# Patient Record
Sex: MALE | Race: WHITE | Employment: FULL TIME | URBAN - METROPOLITAN AREA
[De-identification: names, ages, dates, MRNs, and addresses within clinical notes are randomized per-mention and may not be internally consistent; named-entity substitution may affect disease eponyms.]

---

## 2017-02-03 ENCOUNTER — OFFICE VISIT (OUTPATIENT)
Dept: PRIMARY CARE CLINIC | Age: 32
End: 2017-02-03

## 2017-02-03 VITALS
BODY MASS INDEX: 32.02 KG/M2 | DIASTOLIC BLOOD PRESSURE: 87 MMHG | HEART RATE: 90 BPM | HEIGHT: 72 IN | SYSTOLIC BLOOD PRESSURE: 127 MMHG | TEMPERATURE: 98 F | OXYGEN SATURATION: 98 % | RESPIRATION RATE: 16 BRPM | WEIGHT: 236.4 LBS

## 2017-02-03 DIAGNOSIS — J01.00 ACUTE MAXILLARY SINUSITIS, RECURRENCE NOT SPECIFIED: Primary | ICD-10-CM

## 2017-02-03 RX ORDER — AMOXICILLIN AND CLAVULANATE POTASSIUM 875; 125 MG/1; MG/1
1 TABLET, FILM COATED ORAL EVERY 12 HOURS
Qty: 20 TAB | Refills: 0 | Status: SHIPPED | OUTPATIENT
Start: 2017-02-03 | End: 2017-02-13

## 2017-02-03 NOTE — MR AVS SNAPSHOT
Visit Information Date & Time Provider Department Dept. Phone Encounter #  
 2/3/2017  5:45 PM Brenda Pateldy, [address] [facility] [phone] [id number] Follow-up Instructions Return if symptoms worsen or fail to improve. Upcoming Health Maintenance Date Due DTaP/Tdap/Td series (1 - Tdap) 2/17/2008 INFLUENZA AGE 9 TO ADULT 8/1/2016 Allergies as of 2/3/2017  Review Complete On: 2/3/2017 By: Brenda Campoverde NP Severity Noted Reaction Type Reactions Codeine  02/03/2017    Other (comments) bradycardia Current Immunizations  Never Reviewed No immunizations on file. Not reviewed this visit You Were Diagnosed With   
  
 Codes Comments Acute maxillary sinusitis, recurrence not specified    -  Primary ICD-10-CM: J01.00 ICD-9-CM: 461.0 Vitals BP Pulse Temp Resp Height(growth percentile) Weight(growth percentile) 127/87 (BP 1 Location: Right arm, BP Patient Position: Sitting) 90 98 °F (36.7 °C) (Oral) 16 6' (1.829 m) 236 lb 6.4 oz (107.2 kg) SpO2 BMI Smoking Status 98% 32.06 kg/m2 Never Smoker Vitals History BMI and BSA Data Body Mass Index Body Surface Area 32.06 kg/m 2 2.33 m 2 Preferred Pharmacy Pharmacy Name Phone "[facility][id number] [facility] [phone] Your Updated Medication List  
  
   
This list is accurate as of: 2/3/17  6:42 PM.  Always use your most recent med list.  
  
  
  
  
 amoxicillin-clavulanate 875-125 mg per tablet Commonly known as:  AUGMENTIN Take 1 Tab by mouth every twelve (12) hours for 10 days. Prescriptions Sent to Pharmacy Refills  
 amoxicillin-clavulanate (AUGMENTIN) 875-125 mg per tablet 0 Sig: Take 1 Tab by mouth every twelve (12) hours for 10 days.   
 Class: Normal  
 [id number] [id number], [address] [place] [address] Luis SALINAS AT [facility] #: [phone] Route: Oral  
  
We Performed the Following REFERRAL TO PRIMARY CARE [PGJ470 CPT(R)] Comments:  
 Please evaluate patient for physical and establish PCP Follow-up Instructions Return if symptoms worsen or fail to improve. Referral Information Referral ID Referred By Referred To  
  
 [id number] Lora RENO [facility]   
   [address] [place] [address] [facility], [address] Phone: [phone] Fax: [phone] Visits Status Start Date End Date 1 New Request 2/3/17 2/3/18 If your referral has a status of pending review or denied, additional information will be sent to support the outcome of this decision. Patient Instructions Saline Nasal Washes: Care Instructions Your Care Instructions Saline nasal washes help keep the nasal passages open by washing out thick or dried mucus. This simple remedy can help relieve symptoms of allergies, sinusitis, and colds. It also can make the nose feel more comfortable by keeping the mucous membranes moist. You may notice a little burning sensation in your nose the first few times you use the solution, but this usually gets better in a few days. Follow-up care is a key part of your treatment and safety. Be sure to make and go to all appointments, and call your doctor if you are having problems. It's also a good idea to know your test results and keep a list of the medicines you take. How can you care for yourself at home? · You can buy premixed saline solution in a squeeze bottle or other sinus rinse products at a drugstore. Read and follow the instructions on the label. · You also can make your own saline solution by adding 1 teaspoon of salt and 1 teaspoon of baking soda to 2 cups of distilled water. · If you use a homemade solution, pour a small amount into a clean bowl. Using a rubber bulb syringe, squeeze the syringe and place the tip in the salt water. Pull a small amount of the salt water into the syringe by relaxing your hand. · Sit down with your head tilted slightly back. Do not lie down. Put the tip of the bulb syringe or the squeeze bottle a little way into one of your nostrils. Gently drip or squirt a few drops into the nostril. Repeat with the other nostril. Some sneezing and gagging are normal at first. 
· Gently blow your nose. · Wipe the syringe or bottle tip clean after each use. · Repeat this 2 or 3 times a day. · Use nasal washes gently if you have nosebleeds often. When should you call for help? Watch closely for changes in your health, and be sure to contact your doctor if: 
· You often get nosebleeds. · You have problems doing the nasal washes. Where can you learn more? Go to http://jazz-brandon.info/. Enter [id number] [phone] in the search box to learn more about \"Saline Nasal Washes: Care Instructions. \" Current as of: July 29, 2016 Content Version: 11.1 © 8041-9302 [employer]. Care instructions adapted under license by [employer] (which disclaims liability or warranty for this information). If you have questions about a medical condition or this instruction, always ask your healthcare professional. [address] any warranty or liability for your use of this information. Introducing \[facility]\"" & HEALTH SERVICES! Kiaan Padilla introduces Sasets.com patient portal. Now you can access parts of your medical record, email your doctor's office, and request medication refills online. 1. In your internet browser, go to [email or web address] 2. Click on the First Time User? Click Here link in the Sign In box. You will see the New Member Sign Up page. 3. Enter your [email or web address].com Access Code exactly as it appears below. You will not need to use this code after youve completed the sign-up process.  If you do not sign up before the expiration date, you must request a new code. · O-CODES Access Code: Jihan Aldana Expires: 5/4/2017  6:39 PM 
 
4. Enter the last four digits of your Social Security Number (xxxx) and Date of Birth (mm/dd/yyyy) as indicated and click Submit. You will be taken to the next sign-up page. 5. Create a O-CODES ID. This will be your O-CODES login ID and cannot be changed, so think of one that is secure and easy to remember. 6. Create a O-CODES password. You can change your password at any time. 7. Enter your Password Reset Question and Answer. This can be used at a later time if you forget your password. 8. Enter your e-mail address. You will receive e-mail notification when new information is available in [address] 9. Click Sign Up. You can now view and download portions of your medical record. 10. Click the Download Summary menu link to download a portable copy of your medical information. If you have questions, please visit the Frequently Asked Questions section of the O-CODES website. Remember, O-CODES is NOT to be used for urgent needs. For medical emergencies, dial 911. Now available from your iPhone and Android! Please provide this summary of care documentation to your next provider. If you have any questions after today's visit, please call [phone].

## 2017-02-03 NOTE — PATIENT INSTRUCTIONS
Saline Nasal Washes: Care Instructions  Your Care Instructions  Saline nasal washes help keep the nasal passages open by washing out thick or dried mucus. This simple remedy can help relieve symptoms of allergies, sinusitis, and colds. It also can make the nose feel more comfortable by keeping the mucous membranes moist. You may notice a little burning sensation in your nose the first few times you use the solution, but this usually gets better in a few days. Follow-up care is a key part of your treatment and safety. Be sure to make and go to all appointments, and call your doctor if you are having problems. It's also a good idea to know your test results and keep a list of the medicines you take. How can you care for yourself at home? · You can buy premixed saline solution in a squeeze bottle or other sinus rinse products at a drugstore. Read and follow the instructions on the label. · You also can make your own saline solution by adding 1 teaspoon of salt and 1 teaspoon of baking soda to 2 cups of distilled water. · If you use a homemade solution, pour a small amount into a clean bowl. Using a rubber bulb syringe, squeeze the syringe and place the tip in the salt water. Pull a small amount of the salt water into the syringe by relaxing your hand. · Sit down with your head tilted slightly back. Do not lie down. Put the tip of the bulb syringe or the squeeze bottle a little way into one of your nostrils. Gently drip or squirt a few drops into the nostril. Repeat with the other nostril. Some sneezing and gagging are normal at first.  · Gently blow your nose. · Wipe the syringe or bottle tip clean after each use. · Repeat this 2 or 3 times a day. · Use nasal washes gently if you have nosebleeds often. When should you call for help? Watch closely for changes in your health, and be sure to contact your doctor if:  · You often get nosebleeds. · You have problems doing the nasal washes.   Where can you learn more? Go to http://jazz-brandon.info/. Enter [id number] [id number] [phone] in the search box to learn more about \"Saline Nasal Washes: Care Instructions. \"  Current as of: July 29, 2016  Content Version: 11.1  © 7216-6902 "[employer]. Care instructions adapted under license by [employer] (which disclaims liability or warranty for this information). If you have questions about a medical condition or this instruction, always ask your healthcare professional. [address] any warranty or liability for your use of this information.

## 2017-02-03 NOTE — PROGRESS NOTES
Subjective:   Darryle Colt is a 36 y.o. male who complains of congestion, sneezing, swollen glands, nasal blockage, post nasal drip, dry cough and bilateral sinus pain for 7 days, gradually worsening since that time. He denies a history of shortness of breath and wheezing. Evaluation to date: none. Treatment to date: OTC products. Patient does not smoke cigarettes. Relevant PMH: No pertinent additional PMH. There is no problem list on file for this patient. There are no active problems to display for this patient. Allergies   Allergen Reactions    Codeine Other (comments)     bradycardia     History reviewed. No pertinent past medical history. History reviewed. No pertinent past surgical history. Review of Systems  Pertinent items are noted in HPI. Objective:     Visit Vitals    /87 (BP 1 Location: Right arm, BP Patient Position: Sitting)    Pulse 90    Temp 98 °F (36.7 °C) (Oral)    Resp 16    Ht 6' (1.829 m)    Wt 236 lb 6.4 oz (107.2 kg)    SpO2 98%    BMI 32.06 kg/m2     General:  alert, cooperative, no distress   Eyes: negative   Ears: abnormal TM AD - erythematous, bulging, abnormal TM AS - erythematous, bulging   Sinuses: tenderness over bilateral maxillary, frontal and ethmoid   Mouth:  Lips, mucosa, and tongue normal. Teeth and gums normal and abnormal findings: moderate oropharyngeal erythema   Neck: supple, symmetrical, trachea midline and mild anterior cervical adenopathy. Heart: S1 and S2 normal, no murmurs noted. Lungs: clear to auscultation bilaterally   Abdomen: soft, non-tender. Bowel sounds normal. No masses,  no organomegaly        Assessment/Plan:   sinusitis  Discussed the dx and tx of sinusitis. Suggested symptomatic OTC remedies. Nasal saline sprays for congestion. Antibiotics per orders. RTC prn.

## 2017-02-03 NOTE — PROGRESS NOTES
Chief Complaint   Patient presents with    Nasal Congestion     c/o nasal congestion and produtive cough with green mucus x 1 week, has tried otc decongestants to help    Cough

## 2017-05-22 ENCOUNTER — OFFICE VISIT (OUTPATIENT)
Dept: PRIMARY CARE CLINIC | Age: 32
End: 2017-05-22

## 2017-05-22 VITALS
TEMPERATURE: 98.4 F | RESPIRATION RATE: 16 BRPM | WEIGHT: 230 LBS | HEART RATE: 66 BPM | HEIGHT: 72 IN | DIASTOLIC BLOOD PRESSURE: 62 MMHG | SYSTOLIC BLOOD PRESSURE: 110 MMHG | BODY MASS INDEX: 31.15 KG/M2 | OXYGEN SATURATION: 98 %

## 2017-05-22 DIAGNOSIS — M25.571 ACUTE RIGHT ANKLE PAIN: Primary | ICD-10-CM

## 2017-05-22 NOTE — PROGRESS NOTES
Chief Complaint   Patient presents with    Ankle Injury     c/o R ankle injury, states he twisted R ankle on Friday while at the gym and again on Saturday when he was at a competition,has been using ibuprofen and ice

## 2017-05-22 NOTE — PROGRESS NOTES
Subjective:      Marleny Moeller is a 29 y.o. male seen for evaluation and treatment of a right ankle injury. This is evaluated as a personal injury. The injury was sustained a few days ago, and occurred while [employer]. He states his ankle popped 6-7 times during [employer] several days ago then 2 days ago was doing [employer] again, heard a loud pop. Now having ankle pain and swelling. C/o aching sensation at rest, walking and climbing stairs cause worsening pain. Denies any previous trauma or injury to the right foot/ankle. He is using Lidocaine topical and Ibuprofen for pain. History reviewed. No pertinent past medical history. History reviewed. No pertinent surgical history. Allergies   Allergen Reactions    Codeine Hives    Codeine Other (comments)     bradycardia    Shellfish Derived Nausea and Vomiting       PCP - no PCP      Objective:     Visit Vitals    /62 (BP 1 Location: Left arm, BP Patient Position: Sitting)    Pulse 66    Temp 98.4 °F (36.9 °C) (Oral)    Resp 16    Ht 6' (1.829 m)    Wt 230 lb (104.3 kg)    SpO2 98%    BMI 31.19 kg/m2      Appearance: alert, well appearing, and in no distress. Musculoskeletal exam: abnormal exam of right ankle: swelling to lateral malleolus and TTP. No ecchymosis. Able to bear weight. Imaging  pending official reading by Radiologist    Assessment/Plan:       ICD-10-CM ICD-9-CM    1. Acute right ankle pain M25.571 719.47 XR ANKLE RT MIN 3 V     338.19      Will report xray results when available. Continue Ibuprofen prn pain. The patient is advised to rest, apply cold or ice intermittently, elevate affected extremity and use ACE bandage. Recommend no martial arts for at least a week. See Ortho if persistent symptoms. RTC prn. Anderson Engle NP  This note will not be viewable in [address]

## 2017-05-22 NOTE — MR AVS SNAPSHOT
Visit Information Date & Time Provider Department Dept. Phone Encounter #  
 5/22/2017  3:00 PM Kyle Schmidt NP [address] [facility] [address] [phone] [id number] Follow-up Instructions Return if symptoms worsen or fail to improve. Upcoming Health Maintenance Date Due DTaP/Tdap/Td series (1 - Tdap) 2/17/2008 INFLUENZA AGE 9 TO ADULT 8/1/2017 Allergies as of 5/22/2017  Review Complete On: 5/22/2017 By: Kyle Schmidt NP Severity Noted Reaction Type Reactions Codeine  10/28/2015    Hives Codeine  02/03/2017    Other (comments) bradycardia Shellfish Derived  10/28/2015    Nausea and Vomiting Current Immunizations  Never Reviewed No immunizations on file. Not reviewed this visit You Were Diagnosed With   
  
 Codes Comments Acute right ankle pain    -  Primary ICD-10-CM: M25.571 ICD-9-CM: 719.47, 338.19 Vitals BP Pulse Temp Resp Height(growth percentile) Weight(growth percentile) 110/62 (BP 1 Location: Left arm, BP Patient Position: Sitting) 66 98.4 °F (36.9 °C) (Oral) 16 6' (1.829 m) 230 lb (104.3 kg) SpO2 BMI Smoking Status 98% 31.19 kg/m2 Never Smoker Vitals History BMI and BSA Data Body Mass Index Body Surface Area  
 31.19 kg/m 2 2.3 m 2 Preferred Pharmacy [facility] Name Phone [id number] [facility] [phone] Your Updated Medication List  
  
Notice  As of 5/22/2017  3:52 PM  
 You have not been prescribed any medications. Follow-up Instructions Return if symptoms worsen or fail to improve. To-Do List   
 05/22/2017 Imaging:  XR ANKLE RT MIN 3 V Patient Instructions Learning About RICE (Rest, Ice, Compression, and Elevation) What is RICE? RICE is a way to care for an injury. RICE helps relieve pain and swelling. It may also help with healing and flexibility. RICE stands for: · Rest and protect the injured or sore area. · Ice or a cold pack used as soon as possible. · Compression, or wrapping the injured or sore area with an elastic bandage. · Elevation (propping up) the injured or sore area. How do you do RICE? You can use RICE for home treatment when you have general aches and pains or after an injury or surgery. Rest 
· Do not put weight on the injury for at least 24 to 48 hours. · Use crutches for a badly sprained knee or ankle. · Support a sprained wrist, elbow, or shoulder with a sling. Ice · Put ice or a cold pack on the injury right away to reduce pain and swelling. Frozen vegetables will also work as an ice pack. Put a thin cloth between the ice or cold pack and your skin. The cloth protects the injured area from getting too cold. · Use ice for 10 to 15 minutes at a time for the first 48 to 72 hours. Compression · Use compression for sprains, strains, and surgeries of the arms and legs. · Wrap the injured area with an elastic bandage or compression sleeve to reduce swelling. · Don't wrap it too tightly. If the area below it feels numb, tingles, or feels cool, loosen the wrap. Elevation · Use elevation for areas of the body that can be propped up, such as arms and legs. · Prop up the injured area on pillows whenever you use ice. Keep it propped up anytime you sit or lie down. · Try to keep the injured area at or above the level of your heart. This will help reduce swelling and bruising. Where can you learn more? Go to http://jazz-brandon.info/. Enter Q077 in the search box to learn more about \"Learning About RICE (Rest, Ice, Compression, and Elevation). \" 
Current as of: May 23, 2016 Content Version: 11.2 © 6307-1877 [employer].  Care instructions adapted under license by [employer] (which disclaims liability or warranty for this information). If you have questions about a medical condition or this instruction, always ask your healthcare professional. [address] any warranty or liability for your use of this information. Introducing [facility] & [facility] SERVICES! Kiana Wiggins introduces Posiba patient portal. Now you can access parts of your medical record, email your doctor's office, and request medication refills online. 1. In your internet browser, go to [email or web address] 2. Click on the First Time User? Click Here link in the Sign In box. You will see the New Member Sign Up page. 3. Enter your Posiba Access Code exactly as it appears below. You will not need to use this code after youve completed the sign-up process. If you do not sign up before the expiration date, you must request a new code. · Posiba Access Code: [email or web address] Expires: 8/20/2017  3:21 PM 
 
4. Enter the last four digits of your Social Security Number (xxxx) and Date of Birth (mm/dd/yyyy) as indicated and click Submit. You will be taken to the next sign-up page. 5. Create a Posiba ID. This will be your Posiba login ID and cannot be changed, so think of one that is secure and easy to remember. 6. Create a Posiba password. You can change your password at any time. 7. Enter your Password Reset Question and Answer. This can be used at a later time if you forget your password. 8. Enter your e-mail address. You will receive e-mail notification when new information is available in [address] 9. Click Sign Up. You can now view and download portions of your medical record. 10. Click the Download Summary menu link to download a portable copy of your medical information. If you have questions, please visit the Frequently Asked Questions section of the Posiba website. Remember, Posiba is NOT to be used for urgent needs. For medical emergencies, dial 911. Now available from your iPhone and Android! Please provide this summary of care documentation to your next provider. Your primary care clinician is listed as NONE. If you have any questions after today's visit, please call [phone].

## 2017-05-22 NOTE — PATIENT INSTRUCTIONS
Learning About RICE (Rest, Ice, Compression, and Elevation)  What is RICE? RICE is a way to care for an injury. RICE helps relieve pain and swelling. It may also help with healing and flexibility. RICE stands for:  · Rest and protect the injured or sore area. · Ice or a cold pack used as soon as possible. · Compression, or wrapping the injured or sore area with an elastic bandage. · Elevation (propping up) the injured or sore area. How do you do RICE? You can use RICE for home treatment when you have general aches and pains or after an injury or surgery. Rest  · Do not put weight on the injury for at least 24 to 48 hours. · Use crutches for a badly sprained knee or ankle. · Support a sprained wrist, elbow, or shoulder with a sling. Ice  · Put ice or a cold pack on the injury right away to reduce pain and swelling. Frozen vegetables will also work as an ice pack. Put a thin cloth between the ice or cold pack and your skin. The cloth protects the injured area from getting too cold. · Use ice for 10 to 15 minutes at a time for the first 48 to 72 hours. Compression  · Use compression for sprains, strains, and surgeries of the arms and legs. · Wrap the injured area with an elastic bandage or compression sleeve to reduce swelling. · Don't wrap it too tightly. If the area below it feels numb, tingles, or feels cool, loosen the wrap. Elevation  · Use elevation for areas of the body that can be propped up, such as arms and legs. · Prop up the injured area on pillows whenever you use ice. Keep it propped up anytime you sit or lie down. · Try to keep the injured area at or above the level of your heart. This will help reduce swelling and bruising. Where can you learn more? Go to http://jazz-brandon.info/. Enter Z129 in the search box to learn more about \"Learning About RICE (Rest, Ice, Compression, and Elevation). \"  Current as of: May 23, 2016  Content Version: 11.2  © 6427-3530 [employer]. Care instructions adapted under license by [employer] (which disclaims liability or warranty for this information). If you have questions about a medical condition or this instruction, always ask your healthcare professional. [address] any warranty or liability for your use of this information.

## 2017-12-29 ENCOUNTER — OFFICE VISIT (OUTPATIENT)
Dept: PRIMARY CARE CLINIC | Age: 32
End: 2017-12-29

## 2017-12-29 VITALS
TEMPERATURE: 98.2 F | BODY MASS INDEX: 32.91 KG/M2 | RESPIRATION RATE: 18 BRPM | HEART RATE: 101 BPM | DIASTOLIC BLOOD PRESSURE: 85 MMHG | OXYGEN SATURATION: 96 % | SYSTOLIC BLOOD PRESSURE: 120 MMHG | WEIGHT: 243 LBS | HEIGHT: 72 IN

## 2017-12-29 DIAGNOSIS — J01.00 ACUTE MAXILLARY SINUSITIS, RECURRENCE NOT SPECIFIED: Primary | ICD-10-CM

## 2017-12-29 RX ORDER — AMOXICILLIN AND CLAVULANATE POTASSIUM 875; 125 MG/1; MG/1
1 TABLET, FILM COATED ORAL EVERY 12 HOURS
Qty: 14 TAB | Refills: 0 | Status: SHIPPED | OUTPATIENT
Start: 2017-12-29 | End: 2018-01-05

## 2017-12-29 NOTE — MR AVS SNAPSHOT
Visit Information Date & Time Provider Department Dept. Phone Encounter #  
 12/29/2017 12:15 PM Ciarra Meadows  [facility] [phone] Follow-up Instructions Return if symptoms worsen or fail to improve. Upcoming Health Maintenance Date Due DTaP/Tdap/Td series (1 - Tdap) 2/17/2008 Allergies as of 12/29/2017  Review Complete On: 12/29/2017 By: Ciarra Meadows NP Severity Noted Reaction Type Reactions Codeine  10/28/2015    Hives Codeine  02/03/2017    Other (comments) bradycardia Shellfish Derived  10/28/2015    Nausea and Vomiting Current Immunizations  Never Reviewed No immunizations on file. Not reviewed this visit You Were Diagnosed With   
  
 Codes Comments Acute maxillary sinusitis, recurrence not specified    -  Primary ICD-10-CM: J01.00 ICD-9-CM: 461.0 Vitals BP Pulse Temp Resp Height(growth percentile) Weight(growth percentile) 120/85 (BP 1 Location: Right arm, BP Patient Position: Sitting) (!) 101 98.2 °F (36.8 °C) (Oral) 18 6' (1.829 m) 243 lb (110.2 kg) SpO2 BMI Smoking Status 96% 32.96 kg/m2 Never Smoker Vitals History BMI and BSA Data Body Mass Index Body Surface Area  
 32.96 kg/m 2 2.37 m 2 Preferred Pharmacy Pharmacy Name Phone [phone] [facility] [phone] Your Updated Medication List  
  
   
This list is accurate as of: 12/29/17 12:25 PM.  Always use your most recent med list.  
  
  
  
  
 amoxicillin-clavulanate 875-125 mg per tablet Commonly known as:  AUGMENTIN Take 1 Tab by mouth every twelve (12) hours for 7 days. Prescriptions Sent to Pharmacy Refills  
 amoxicillin-clavulanate (AUGMENTIN) 875-125 mg per tablet 0 Sig: Take 1 Tab by mouth every twelve (12) hours for 7 days.   
 Class: Normal  
 Pharmacy: [facility]/pharmacy #[id number] - [address], [address] [place] [address] #: [phone] Route: Oral  
  
We Performed the Following REFERRAL TO PRIMARY CARE [HOJ303 CPT(R)] Comments:  
 Please evaluate patient to establish primary care. Follow-up Instructions Return if symptoms worsen or fail to improve. Referral Information Referral ID Referred By Referred To  
  
 [id number] [employer] Necessary Saroj Stwejesse III, DO Rajiv Macdonald [facility] [place] [facility] [address] [place], [address] Phone: [phone] Fax: [phone] Visits Status Start Date End Date 1 New Request 12/29/17 12/29/18 If your referral has a status of pending review or denied, additional information will be sent to support the outcome of this decision. Patient Instructions Sinusitis: Care Instructions Your Care Instructions Sinusitis is an infection of the lining of the sinus cavities in your head. Sinusitis often follows a cold. It causes pain and pressure in your head and face. In most cases, sinusitis gets better on its own in 1 to 2 weeks. But some mild symptoms may last for several weeks. Sometimes antibiotics are needed. Follow-up care is a key part of your treatment and safety. Be sure to make and go to all appointments, and call your doctor if you are having problems. It's also a good idea to know your test results and keep a list of the medicines you take. How can you care for yourself at home? · Take an over-the-counter pain medicine, such as acetaminophen (Tylenol), ibuprofen (Advil, Motrin), or naproxen (Aleve). Read and follow all instructions on the label. · If the doctor prescribed antibiotics, take them as directed. Do not stop taking them just because you feel better. You need to take the full course of antibiotics.  
· Be careful when taking over-the-counter cold or flu medicines and Tylenol at the same time. Many of these medicines have acetaminophen, which is Tylenol. Read the labels to make sure that you are not taking more than the recommended dose. Too much acetaminophen (Tylenol) can be harmful. · Breathe warm, moist air from a steamy shower, a hot bath, or a sink filled with hot water. Avoid cold, dry air. Using a humidifier in your home may help. Follow the directions for cleaning the machine. · Use saline (saltwater) nasal washes to help keep your nasal passages open and wash out mucus and bacteria. You can buy saline nose drops at a grocery store or drugstore. Or you can make your own at home by adding 1 teaspoon of salt and 1 teaspoon of baking soda to 2 cups of distilled water. If you make your own, fill a bulb syringe with the solution, insert the tip into your nostril, and squeeze gently. [place] your nose. · Put a hot, wet towel or a warm gel pack on your face 3 or 4 times a day for 5 to 10 minutes each time. · Try a decongestant nasal spray like oxymetazoline (Afrin). Do not use it for more than 3 days in a row. Using it for more than 3 days can make your congestion worse. When should you call for help? Call your doctor now or seek immediate medical care if: 
? · You have new or worse swelling or redness in your face or around your eyes. ? · You have a new or higher fever. ? Watch closely for changes in your health, and be sure to contact your doctor if: 
? · You have new or worse facial pain. ? · The mucus from your nose becomes thicker (like pus) or has new blood in it. ? · You are not getting better as expected. Where can you learn more? Go to http://jazz-brandon.info/. Enter Y913 in the search box to learn more about \"Sinusitis: Care Instructions. \" Current as of: May 12, 2017 Content Version: 11.4 © 2603-5913 [employer].  Care instructions adapted under license by [employer] (which disclaims liability or warranty for this information). If you have questions about a medical condition or this instruction, always ask your healthcare professional. [address] any warranty or liability for your use of this information. Saline Nasal Washes: Care Instructions Your Care Instructions Saline nasal washes help keep the nasal passages open by washing out thick or dried mucus. This simple remedy can help relieve symptoms of allergies, sinusitis, and colds. It also can make the nose feel more comfortable by keeping the mucous membranes moist. You may notice a little burning sensation in your nose the first few times you use the solution, but this usually gets better in a few days. Follow-up care is a key part of your treatment and safety. Be sure to make and go to all appointments, and call your doctor if you are having problems. It's also a good idea to know your test results and keep a list of the medicines you take. How can you care for yourself at home? · You can buy premixed saline solution in a squeeze bottle or other sinus rinse products at a drugstore. Read and follow the instructions on the label. · You also can make your own saline solution by adding 1 teaspoon of salt and 1 teaspoon of baking soda to 2 cups of distilled water. · If you use a homemade solution, pour a small amount into a clean bowl. Using a rubber bulb syringe, squeeze the syringe and place the tip in the salt water. Pull a small amount of the salt water into the syringe by relaxing your hand. · Sit down with your head tilted slightly back. Do not lie down. Put the tip of the bulb syringe or the squeeze bottle a little way into one of your nostrils. Gently drip or squirt a few drops into the nostril. Repeat with the other nostril. Some sneezing and gagging are normal at first. 
· Gently blow your nose. · Wipe the syringe or bottle tip clean after each use. · Repeat this 2 or 3 times a day. · Use nasal washes gently if you have nosebleeds often. When should you call for help? Watch closely for changes in your health, and be sure to contact your doctor if: 
? · You often get nosebleeds. ? · You have problems doing the nasal washes. Where can you learn more? Go to http://jazz-brandon.info/. Enter [id number] in the search box to learn more about \"Saline Nasal Washes: Care Instructions. \" Current as of: May 12, 2017 Content Version: 11.4 © 1752-7381 [employer]. Care instructions adapted under license by [employer] (which disclaims liability or warranty for this information). If you have questions about a medical condition or this instruction, always ask your healthcare professional. [address] any warranty or liability for your use of this information. Introducing [facility]! Eloisa Waller introduces Bellabox patient portal. Now you can access parts of your medical record, email your doctor's office, and request medication refills online. 1. In your internet browser, go to [email or web address] 2. Click on the First Time User? Click Here link in the Sign In box. You will see the New Member Sign Up page. 3. Enter your Bellabox Access Code exactly as it appears below. You will not need to use this code after youve completed the sign-up process. If you do not sign up before the expiration date, you must request a new code. · Bellabox Access Code: [email or web address] Expires: 3/29/2018 12:09 PM 
 
4. Enter the last four digits of your Social Security Number (xxxx) and Date of Birth (mm/dd/yyyy) as indicated and click Submit. You will be taken to the next sign-up page. 5. Create a Stolen Couch Gamest ID. This will be your Bellabox login ID and cannot be changed, so think of one that is secure and easy to remember. 6. Create a Bellabox password. You can change your password at any time. 7. Enter your Password Reset Question and Answer. This can be used at a later time if you forget your password. 8. Enter your e-mail address. You will receive e-mail notification when new information is available in [address] 9. Click Sign Up. You can now view and download portions of your medical record. 10. Click the Download Summary menu link to download a portable copy of your medical information. If you have questions, please visit the Frequently Asked Questions section of the InstantQuest website. Remember, InstantQuest is NOT to be used for urgent needs. For medical emergencies, dial 911. Now available from your iPhone and Android! Please provide this summary of care documentation to your next provider. Your primary care clinician is listed as NONE. If you have any questions after today's visit, please call [phone].

## 2017-12-29 NOTE — PROGRESS NOTES
Subjective:   Cash Guerra is a 29 y.o. male who presents with \"sinus infection\". C/o severe congestion, thick green nasal discharge, sore throat, and cough for 3-4 days, gradually worsening. Denies any fevers or chills. Ears feel congested, denies any pain. He denies a history of shortness of breath and wheezing. Evaluation to date: none. Treatment to date: OTC products. Patient does not smoke cigarettes. He does vape. Relevant PMH: History reviewed. No pertinent past medical history. History reviewed. No pertinent surgical history. Allergies   Allergen Reactions    Codeine Hives    Codeine Other (comments)     bradycardia    Shellfish Derived Nausea and Vomiting       Review of Systems  Pertinent items are noted in HPI. Objective:     Visit Vitals    /85 (BP 1 Location: Right arm, BP Patient Position: Sitting)    Pulse (!) 101    Temp 98.2 °F (36.8 °C) (Oral)    Resp 18    Ht 6' (1.829 m)    Wt 243 lb (110.2 kg)    SpO2 96%    BMI 32.96 kg/m2     General:  alert, cooperative, no distress   Eyes: negative   Ears: TM's appear normal, moderate amount of ceruminosis bilaterally   Sinuses: tenderness over bilateral maxillary   Mouth:  Lips, mucosa, and tongue normal. Teeth and gums normal and abnormal findings: mild oropharyngeal erythema and PND   Neck: supple, symmetrical, trachea midline and no adenopathy. Heart: S1 and S2 normal, no murmurs noted. Lungs: clear to auscultation bilaterally        Assessment/Plan:       ICD-10-CM ICD-9-CM    1. Acute maxillary sinusitis, recurrence not specified J01.00 461.0 REFERRAL TO PRIMARY CARE     Orders Placed This Encounter    REFERRAL TO PRIMARY CARE    amoxicillin-clavulanate (AUGMENTIN) 875-125 mg per tablet     Discussed the dx and tx of sinusitis. Suggested symptomatic OTC remedies. Nasal saline sprays for congestion. Antibiotics per orders. RTC prn.       Erickson Andujar NP  This note will not be viewable in MyChart.

## 2017-12-29 NOTE — PATIENT INSTRUCTIONS
Sinusitis: Care Instructions  Your Care Instructions    Sinusitis is an infection of the lining of the sinus cavities in your head. Sinusitis often follows a cold. It causes pain and pressure in your head and face. In most cases, sinusitis gets better on its own in 1 to 2 weeks. But some mild symptoms may last for several weeks. Sometimes antibiotics are needed. Follow-up care is a key part of your treatment and safety. Be sure to make and go to all appointments, and call your doctor if you are having problems. It's also a good idea to know your test results and keep a list of the medicines you take. How can you care for yourself at home? · Take an over-the-counter pain medicine, such as acetaminophen (Tylenol), ibuprofen (Advil, Motrin), or naproxen (Aleve). Read and follow all instructions on the label. · If the doctor prescribed antibiotics, take them as directed. Do not stop taking them just because you feel better. You need to take the full course of antibiotics. · Be careful when taking over-the-counter cold or flu medicines and Tylenol at the same time. Many of these medicines have acetaminophen, which is Tylenol. Read the labels to make sure that you are not taking more than the recommended dose. Too much acetaminophen (Tylenol) can be harmful. · Breathe warm, moist air from a steamy shower, a hot bath, or a sink filled with hot water. Avoid cold, dry air. Using a humidifier in your home may help. Follow the directions for cleaning the machine. · Use saline (saltwater) nasal washes to help keep your nasal passages open and wash out mucus and bacteria. You can buy saline nose drops at a grocery store or drugstore. Or you can make your own at home by adding 1 teaspoon of salt and 1 teaspoon of baking soda to 2 cups of distilled water. If you make your own, fill a bulb syringe with the solution, insert the tip into your nostril, and squeeze gently. [place] your nose.   · Put a hot, wet towel or a warm gel pack on your face 3 or 4 times a day for 5 to 10 minutes each time. · Try a decongestant nasal spray like oxymetazoline (Afrin). Do not use it for more than 3 days in a row. Using it for more than 3 days can make your congestion worse. When should you call for help? Call your doctor now or seek immediate medical care if:  ? · You have new or worse swelling or redness in your face or around your eyes. ? · You have a new or higher fever. ? Watch closely for changes in your health, and be sure to contact your doctor if:  ? · You have new or worse facial pain. ? · The mucus from your nose becomes thicker (like pus) or has new blood in it. ? · You are not getting better as expected. Where can you learn more? Go to http://jazz-brandon.info/. Enter O430 in the search box to learn more about \"Sinusitis: Care Instructions. \"  Current as of: May 12, 2017  Content Version: 11.4  © 1204-4363 [employer]. Care instructions adapted under license by [employer] (which disclaims liability or warranty for this information). If you have questions about a medical condition or this instruction, always ask your healthcare professional. [place] [address] any warranty or liability for your use of this information. Saline Nasal Washes: Care Instructions  Your Care Instructions  Saline nasal washes help keep the nasal passages open by washing out thick or dried mucus. This simple remedy can help relieve symptoms of allergies, sinusitis, and colds. It also can make the nose feel more comfortable by keeping the mucous membranes moist. You may notice a little burning sensation in your nose the first few times you use the solution, but this usually gets better in a few days. Follow-up care is a key part of your treatment and safety. Be sure to make and go to all appointments, and call your doctor if you are having problems.  It's also a good idea to know your test results and keep a list of the medicines you take. How can you care for yourself at home? · You can buy premixed saline solution in a squeeze bottle or other sinus rinse products at a drugstore. Read and follow the instructions on the label. · You also can make your own saline solution by adding 1 teaspoon of salt and 1 teaspoon of baking soda to 2 cups of distilled water. · If you use a homemade solution, pour a small amount into a clean bowl. Using a rubber bulb syringe, squeeze the syringe and place the tip in the salt water. Pull a small amount of the salt water into the syringe by relaxing your hand. · Sit down with your head tilted slightly back. Do not lie down. Put the tip of the bulb syringe or the squeeze bottle a little way into one of your nostrils. Gently drip or squirt a few drops into the nostril. Repeat with the other nostril. Some sneezing and gagging are normal at first.  · Gently blow your nose. · Wipe the syringe or bottle tip clean after each use. · Repeat this 2 or 3 times a day. · Use nasal washes gently if you have nosebleeds often. When should you call for help? Watch closely for changes in your health, and be sure to contact your doctor if:  ? · You often get nosebleeds. ? · You have problems doing the nasal washes. Where can you learn more? Go to http://jazz-brandon.info/. Enter [id number] [phone] in the search box to learn more about \"Saline Nasal Washes: Care Instructions. \"  Current as of: May 12, 2017  Content Version: 11.4  © 7787-8560 [employer]. Care instructions adapted under license by [employer] (which disclaims liability or warranty for this information). If you have questions about a medical condition or this instruction, always ask your healthcare professional. [address] any warranty or liability for your use of this information.

## 2018-02-07 ENCOUNTER — OFFICE VISIT (OUTPATIENT)
Dept: PRIMARY CARE CLINIC | Age: 33
End: 2018-02-07

## 2018-02-07 VITALS
SYSTOLIC BLOOD PRESSURE: 117 MMHG | BODY MASS INDEX: 33.05 KG/M2 | RESPIRATION RATE: 18 BRPM | DIASTOLIC BLOOD PRESSURE: 74 MMHG | HEIGHT: 72 IN | WEIGHT: 244 LBS | TEMPERATURE: 98 F | OXYGEN SATURATION: 97 % | HEART RATE: 94 BPM

## 2018-02-07 DIAGNOSIS — J01.00 ACUTE MAXILLARY SINUSITIS, RECURRENCE NOT SPECIFIED: Primary | ICD-10-CM

## 2018-02-07 RX ORDER — AMOXICILLIN AND CLAVULANATE POTASSIUM 875; 125 MG/1; MG/1
1 TABLET, FILM COATED ORAL EVERY 12 HOURS
Qty: 14 TAB | Refills: 0 | Status: SHIPPED | OUTPATIENT
Start: 2018-02-10 | End: 2018-02-17

## 2018-02-07 NOTE — MR AVS SNAPSHOT
José Miguel [address] 
[phone] Patient: Yu Pittman MRN: [id number] :1985 Visit Information Date & Time Provider Department Dept. Phone Encounter #  
 2018  6:30 PM Dontrell To, [address] [place] [address] [phone] [id number] Upcoming Health Maintenance Date Due DTaP/Tdap/Td series (1 - Tdap) 2008 Allergies as of 2018  Review Complete On: 2018 By: Dontrell To MD  
  
 Severity Noted Reaction Type Reactions Codeine  10/28/2015    Hives Codeine  2017    Other (comments) bradycardia Shellfish Derived  10/28/2015    Nausea and Vomiting Current Immunizations  Never Reviewed No immunizations on file. Not reviewed this visit You Were Diagnosed With   
  
 Codes Comments Acute maxillary sinusitis, recurrence not specified    -  Primary ICD-10-CM: J01.00 ICD-9-CM: 461.0 Vitals BP Pulse Temp Resp Height(growth percentile) Weight(growth percentile) 117/74 (BP 1 Location: Left arm, BP Patient Position: Sitting) 94 98 °F (36.7 °C) (Oral) 18 6' (1.829 m) 244 lb (110.7 kg) SpO2 BMI Smoking Status 97% 33.09 kg/m2 Never Smoker BMI and BSA Data Body Mass Index Body Surface Area 33.09 kg/m 2 2.37 m 2 Preferred Pharmacy Pharmacy Name Phone [phone] [phone] Your Updated Medication List  
  
   
This list is accurate as of: 18  7:35 PM.  Always use your most recent med list.  
  
  
  
  
 amoxicillin-clavulanate 875-125 mg per tablet Commonly known as:  AUGMENTIN Take 1 Tab by mouth every twelve (12) hours for 7 days. Start taking on:  2/10/2018 Prescriptions Sent to Pharmacy  Refills  
 amoxicillin-clavulanate (AUGMENTIN) 875-125 mg per tablet 0  
 Starting on: 2/10/2018 Sig: Take 1 Tab by mouth every twelve (12) hours for 7 days. Class: Normal  
 Pharmacy: [facility] #: [phone] Route: Oral  
  
Patient Instructions Sinusitis: Care Instructions Your Care Instructions Sinusitis is an infection of the lining of the sinus cavities in your head. Sinusitis often follows a cold. It causes pain and pressure in your head and face. In most cases, sinusitis gets better on its own in 1 to 2 weeks. But some mild symptoms may last for several weeks. Sometimes antibiotics are needed. Follow-up care is a key part of your treatment and safety. Be sure to make and go to all appointments, and call your doctor if you are having problems. It's also a good idea to know your test results and keep a list of the medicines you take. How can you care for yourself at home? · Take an over-the-counter pain medicine, such as acetaminophen (Tylenol), ibuprofen (Advil, Motrin), or naproxen (Aleve). Read and follow all instructions on the label. · If the doctor prescribed antibiotics, take them as directed. Do not stop taking them just because you feel better. You need to take the full course of antibiotics. · Be careful when taking over-the-counter cold or flu medicines and Tylenol at the same time. Many of these medicines have acetaminophen, which is Tylenol. Read the labels to make sure that you are not taking more than the recommended dose. Too much acetaminophen (Tylenol) can be harmful. · Breathe warm, moist air from a steamy shower, a hot bath, or a sink filled with hot water. Avoid cold, dry air. Using a humidifier in your home may help. Follow the directions for cleaning the machine. · Use saline (saltwater) nasal washes to help keep your nasal passages open and wash out mucus and bacteria.  You can buy saline nose drops at a grocery store or drugstore. Or you can make your own at home by adding 1 teaspoon of salt and 1 teaspoon of baking soda to 2 cups of distilled water. If you make your own, fill a bulb syringe with the solution, insert the tip into your nostril, and squeeze gently. Lonza Muff your nose. · Put a hot, wet towel or a warm gel pack on your face 3 or 4 times a day for 5 to 10 minutes each time. · Try a decongestant nasal spray like oxymetazoline (Afrin). Do not use it for more than 3 days in a row. Using it for more than 3 days can make your congestion worse. When should you call for help? Call your doctor now or seek immediate medical care if: 
? · You have new or worse swelling or redness in your face or around your eyes. ? · You have a new or higher fever. ? Watch closely for changes in your health, and be sure to contact your doctor if: 
? · You have new or worse facial pain. ? · The mucus from your nose becomes thicker (like pus) or has new blood in it. ? · You are not getting better as expected. Where can you learn more? Go to http://jazz-brandon.info/. Enter S235 in the search box to learn more about \"Sinusitis: Care Instructions. \" Current as of: May 12, 2017 Content Version: 11.4 © 4604-2259 Screenie. Care instructions adapted under license by [employer] (which disclaims liability or warranty for this information). If you have questions about a medical condition or this instruction, always ask your healthcare professional. [place] [address] any warranty or liability for your use of this information. Learning About the Safe Use of Antibiotics Introduction Antibiotics are drugs used to kill bacteria. Bacteria can cause infections. These include strep throat, ear infections, and pneumonia. These medicines can't cure everything. They don't kill viruses or help with allergies.  They don't help illnesses such as the common cold, the flu, or a runny nose. And they can cause side effects. There are many types of antibiotics. Your doctor will decide which one will work best for your infection. Examples include: · Amoxicillin. · Cephalexin (Keflex). · Ciprofloxacin (Cipro). What are the possible side effects? Side effects can include: 
· Nausea. · Diarrhea. · Skin rash. · Yeast infection. · A severe allergic reaction. It may cause itching, swelling, and breathing problems. This is rare. You may have other side effects or reactions not listed here. Check the information that comes with your medicine. Should you take antibiotics just in case? Don't take antibiotics when you don't need them. If you do that, they may not work when you do need them. Each time you take antibiotics, you are more likely to have some bacteria that survive and aren't killed by the medicine. Bacteria that don't die can change and become even harder to kill. These are called antibiotic-resistant bacteria. They can cause longer and more serious infections. To treat them, you may need different, stronger antibiotics that have more side effects and may cost more. So always ask your doctor if antibiotics are the best treatment. Explain that you do not want antibiotics unless you need them. Help protect the community Using antibiotics when they're not needed leads to the development of antibiotic-resistant bacteria. These tougher bacteria can spread to family members, children, and coworkers. People in your community will have a risk of getting an infection that is harder to cure and that costs more to treat. How can you take antibiotics safely? Be safe with medicine. Take your antibiotics as directed. Do not stop taking them just because you feel better. You need to take the full course of medicine. This will help make sure your infection is cured. It will also help prevent the growth of antibiotic-resistant bacteria. Always take the exact amount that the label says to take. If the label says to take the medicine at a certain time, follow those directions. You might feel better after you take an antibiotic for a few days. But it is important to keep taking it for as long as prescribed. That will help you get rid of those bacteria that are a bit stronger and that survive the first few days of treatment. Where can you learn more? Go to http://jazz-brandon.info/. Enter F695 in the search box to learn more about \"Learning About the Safe Use of Antibiotics. \" Current as of: March 3, 2017 Content Version: 11.4 © 7665-8677 Blink for iPhone and Android. Care instructions adapted under license by [employer] (which disclaims liability or warranty for this information). If you have questions about a medical condition or this instruction, always ask your healthcare professional. [address] any warranty or liability for your use of this information. Introducing [facility]! Hailey Gonsales introduces [employer] patient portal. Now you can access parts of your medical record, email your doctor's office, and request medication refills online. 1. In your internet browser, go to [email or web address] 2. Click on the First Time User? Click Here link in the Sign In box. You will see the New Member Sign Up page. 3. Enter your [employer] Access Code exactly as it appears below. You will not need to use this code after youve completed the sign-up process. If you do not sign up before the expiration date, you must request a new code. · [employer] Access Code: [email or web address] Expires: 3/29/2018 12:09 PM 
 
4. Enter the last four digits of your Social Security Number (xxxx) and Date of Birth (mm/dd/yyyy) as indicated and click Submit. You will be taken to the next sign-up page. 5. Create a [employer] ID.  This will be your [employer] login ID and cannot be changed, so think of one that is secure and easy to remember. 6. Create a [employer] password. You can change your password at any time. 7. Enter your Password Reset Question and Answer. This can be used at a later time if you forget your password. 8. Enter your e-mail address. You will receive e-mail notification when new information is available in [address] 9. Click Sign Up. You can now view and download portions of your medical record. 10. Click the Download Summary menu link to download a portable copy of your medical information. If you have questions, please visit the Frequently Asked Questions section of the [employer] website. Remember, [employer] is NOT to be used for urgent needs. For medical emergencies, dial 911. Now available from your iPhone and Android! Please provide this summary of care documentation to your next provider. Your primary care clinician is listed as NONE. If you have any questions after today's visit, please call [phone].

## 2018-02-08 NOTE — PROGRESS NOTES
Chief Complaint   Patient presents with    Sinus Pain     and pressure for a few days, has tried [employer]

## 2018-02-08 NOTE — PROGRESS NOTES
Subjective:      Chantale Alvarenga is a 29 y.o. male who presents for evaluation of possible sinus infection. Sinus pressure, nasal congestion, yellow-green nasal drainage, started 3-4 days. No fever or chills. No cough, sore throat, earpain. He has tried sudafed. Review of Systems   Constitutional: Negative for chills and fever. HENT: Positive for congestion and sinus pain. Negative for ear pain and sore throat. Eyes: Negative for blurred vision, double vision, photophobia and pain. Respiratory: Negative for cough, shortness of breath and stridor. Cardiovascular: Negative for chest pain. Gastrointestinal: Negative for abdominal pain, diarrhea, nausea and vomiting. Skin: Negative for rash. Neurological: Negative for tingling, sensory change, focal weakness and headaches. History reviewed. No pertinent past medical history. Family History   Problem Relation Age of Onset    Diabetes Father        Allergies   Allergen Reactions    Codeine Hives    Codeine Other (comments)     bradycardia    Shellfish Derived Nausea and Vomiting     Social History     Social History    Marital status:      Spouse name: N/A    Number of children: N/A    Years of education: N/A     Occupational History    Not on file. Social History Main Topics    Smoking status: Never Smoker    Smokeless tobacco: Never Used    Alcohol use Yes      Comment: occasional cigars     Drug use: No    Sexual activity: Yes     Partners: Female     Birth control/ protection: Condom     Other Topics Concern    Not on file     Social History Narrative         Objective:     Visit Vitals    /74 (BP 1 Location: Left arm, BP Patient Position: Sitting)    Pulse 94    Temp 98 °F (36.7 °C) (Oral)    Resp 18    Ht 6' (1.829 m)    Wt 244 lb (110.7 kg)    SpO2 97%    BMI 33.09 kg/m2     General: Alert and oriented and in no acute distress. Responds to all questions appropriately. SKIN: No rash.   HEAD: bilaterally maxillary sinus tenderness. EYES: Sclera and conjunctiva clear; pupils round and reactive to light. EARS: External normal, canals clear, tympanic membranes normal.     NOSE: Edema and erythema of the turbinates with clear mucous drainage. OROPHARYNX: Slight tonsil edema and erythema with no exudate. NECK: Supple; no masses; normal lymphadenopathy. LUNGS: Clear to auscultation bilaterally, no wheeze, rales and rhonchi. CARDIOVASCULAR: Regular, rate, and rhythm without murmurs, gallops or rubs. NEUROLOGIC: Speech intact; face symmetrical; moves all extremities equally. Assessment:       ICD-10-CM ICD-9-CM    1. Acute maxillary sinusitis, recurrence not specified J01.00 461.0 amoxicillin-clavulanate (AUGMENTIN) 875-125 mg per tablet     Likely viral, monitor sxs x2-3 days, if no improvement may start augmentin. Return PRN. Plan:     1. Nasal saline rinses as needed for congestion. 2. Follow-up  in 10 days  if symptoms worsen or persist.  3. Over-the-counter mediciations:    1. Ibuprofen (Motrin, Advil): 200mg  take 1-4 three times a day for 5 days. -OR-    Naproxin (Aleve): 220mg 1-2 tablets twice a day for 5 days. 2. Acetaminophen (Tylenol): 500mg 1-2 tablets every 6 hours as needed for pain. 3. Combination cough and decongestant medicine such as Mucinex D.  4. Sudafed  5. Augmentin       This note will not be viewable in [employer].

## 2018-02-08 NOTE — PATIENT INSTRUCTIONS
Sinusitis: Care Instructions  Your Care Instructions    Sinusitis is an infection of the lining of the sinus cavities in your head. Sinusitis often follows a cold. It causes pain and pressure in your head and face. In most cases, sinusitis gets better on its own in 1 to 2 weeks. But some mild symptoms may last for several weeks. Sometimes antibiotics are needed. Follow-up care is a key part of your treatment and safety. Be sure to make and go to all appointments, and call your doctor if you are having problems. It's also a good idea to know your test results and keep a list of the medicines you take. How can you care for yourself at home? · Take an over-the-counter pain medicine, such as acetaminophen (Tylenol), ibuprofen (Advil, Motrin), or naproxen (Aleve). Read and follow all instructions on the label. · If the doctor prescribed antibiotics, take them as directed. Do not stop taking them just because you feel better. You need to take the full course of antibiotics. · Be careful when taking over-the-counter cold or flu medicines and Tylenol at the same time. Many of these medicines have acetaminophen, which is Tylenol. Read the labels to make sure that you are not taking more than the recommended dose. Too much acetaminophen (Tylenol) can be harmful. · Breathe warm, moist air from a steamy shower, a hot bath, or a sink filled with hot water. Avoid cold, dry air. Using a humidifier in your home may help. Follow the directions for cleaning the machine. · Use saline (saltwater) nasal washes to help keep your nasal passages open and wash out mucus and bacteria. You can buy saline nose drops at a grocery store or drugstore. Or you can make your own at home by adding 1 teaspoon of salt and 1 teaspoon of baking soda to 2 cups of distilled water. If you make your own, fill a bulb syringe with the solution, insert the tip into your nostril, and squeeze gently. Tracey Sell your nose.   · Put a hot, wet towel or a warm gel pack on your face 3 or 4 times a day for 5 to 10 minutes each time. · Try a decongestant nasal spray like oxymetazoline (Afrin). Do not use it for more than 3 days in a row. Using it for more than 3 days can make your congestion worse. When should you call for help? Call your doctor now or seek immediate medical care if:  ? · You have new or worse swelling or redness in your face or around your eyes. ? · You have a new or higher fever. ? Watch closely for changes in your health, and be sure to contact your doctor if:  ? · You have new or worse facial pain. ? · The mucus from your nose becomes thicker (like pus) or has new blood in it. ? · You are not getting better as expected. Where can you learn more? Go to http://jazz-brandon.info/. Enter E175 in the search box to learn more about \"Sinusitis: Care Instructions. \"  Current as of: May 12, 2017  Content Version: 11.4  © 0681-6077 YieldPlanet. Care instructions adapted under license by [employer] (which disclaims liability or warranty for this information). If you have questions about a medical condition or this instruction, always ask your healthcare professional. [place] [address] any warranty or liability for your use of this information. Learning About the Safe Use of Antibiotics  Introduction    Antibiotics are drugs used to kill bacteria. Bacteria can cause infections. These include strep throat, ear infections, and pneumonia. These medicines can't cure everything. They don't kill viruses or help with allergies. They don't help illnesses such as the common cold, the flu, or a runny nose. And they can cause side effects. There are many types of antibiotics. Your doctor will decide which one will work best for your infection. Examples include:  · Amoxicillin. · Cephalexin (Keflex). · Ciprofloxacin (Cipro). What are the possible side effects?   Side effects can include:  · Nausea. · Diarrhea. · Skin rash. · Yeast infection. · A severe allergic reaction. It may cause itching, swelling, and breathing problems. This is rare. You may have other side effects or reactions not listed here. Check the information that comes with your medicine. Should you take antibiotics just in case? Don't take antibiotics when you don't need them. If you do that, they may not work when you do need them. Each time you take antibiotics, you are more likely to have some bacteria that survive and aren't killed by the medicine. Bacteria that don't die can change and become even harder to kill. These are called antibiotic-resistant bacteria. They can cause longer and more serious infections. To treat them, you may need different, stronger antibiotics that have more side effects and may cost more. So always ask your doctor if antibiotics are the best treatment. Explain that you do not want antibiotics unless you need them. Help protect the community  Using antibiotics when they're not needed leads to the development of antibiotic-resistant bacteria. These tougher bacteria can spread to family members, children, and coworkers. People in your community will have a risk of getting an infection that is harder to cure and that costs more to treat. How can you take antibiotics safely? Be safe with medicine. Take your antibiotics as directed. Do not stop taking them just because you feel better. You need to take the full course of medicine. This will help make sure your infection is cured. It will also help prevent the growth of antibiotic-resistant bacteria. Always take the exact amount that the label says to take. If the label says to take the medicine at a certain time, follow those directions. You might feel better after you take an antibiotic for a few days. But it is important to keep taking it for as long as prescribed.  That will help you get rid of those bacteria that are a bit stronger and that survive the first few days of treatment. Where can you learn more? Go to http://jazz-brandon.info/. Enter F695 in the search box to learn more about \"Learning About the Safe Use of Antibiotics. \"  Current as of: March 3, 2017  Content Version: 11.4  © 3143-2068 [employer]pe. Care instructions adapted under license by [employer] (which disclaims liability or warranty for this information). If you have questions about a medical condition or this instruction, always ask your healthcare professional. [address] any warranty or liability for your use of this information.

## 2018-03-07 ENCOUNTER — OFFICE VISIT (OUTPATIENT)
Dept: PRIMARY CARE CLINIC | Age: 33
End: 2018-03-07

## 2018-03-07 VITALS
BODY MASS INDEX: 32.56 KG/M2 | TEMPERATURE: 98 F | HEIGHT: 72 IN | RESPIRATION RATE: 16 BRPM | SYSTOLIC BLOOD PRESSURE: 118 MMHG | WEIGHT: 240.4 LBS | OXYGEN SATURATION: 98 % | DIASTOLIC BLOOD PRESSURE: 73 MMHG | HEART RATE: 56 BPM

## 2018-03-07 DIAGNOSIS — L08.9 OPEN WOUND OF FINGER, INFECTED, INITIAL ENCOUNTER: ICD-10-CM

## 2018-03-07 DIAGNOSIS — S61.212A LACERATION OF RIGHT MIDDLE FINGER WITHOUT FOREIGN BODY WITHOUT DAMAGE TO NAIL, INITIAL ENCOUNTER: Primary | ICD-10-CM

## 2018-03-07 DIAGNOSIS — S61.209A OPEN WOUND OF FINGER, INFECTED, INITIAL ENCOUNTER: ICD-10-CM

## 2018-03-07 RX ORDER — SULFAMETHOXAZOLE AND TRIMETHOPRIM 800; 160 MG/1; MG/1
1 TABLET ORAL 2 TIMES DAILY
Qty: 20 TAB | Refills: 0 | Status: SHIPPED | OUTPATIENT
Start: 2018-03-07 | End: 2018-03-17

## 2018-03-07 NOTE — MR AVS SNAPSHOT
[address] [place] [facility] 83. 
[phone] Patient: Guillermo Yu MRN: [id number] :1985 Visit Information Date & Time Provider Department Dept. Phone Encounter #  
 3/7/2018  6:00 PM Darrius Gutierrez, [address] [address] [phone] [id number] Follow-up Instructions Return if symptoms worsen or fail to improve. Upcoming Health Maintenance Date Due DTaP/Tdap/Td series (1 - Tdap) 2008 Allergies as of 3/7/2018  Review Complete On: 3/7/2018 By: Darrius Gutierrez NP Severity Noted Reaction Type Reactions Codeine  10/28/2015    Hives Codeine  2017    Other (comments) bradycardia Shellfish Derived  10/28/2015    Nausea and Vomiting Current Immunizations  Never Reviewed No immunizations on file. Not reviewed this visit You Were Diagnosed With   
  
 Codes Comments Laceration of right middle finger without foreign body without damage to nail, initial encounter    -  Primary ICD-10-CM: M56.299D ICD-9-CM: 883.0 Open wound of finger, infected, initial encounter     ICD-10-CM: S61.209A, L08.9 ICD-9-CM: 432. 1 Vitals BP Pulse Temp Resp Height(growth percentile) Weight(growth percentile) 118/73 (!) 56 98 °F (36.7 °C) (Oral) 16 6' (1.829 m) 240 lb 6.4 oz (109 kg) SpO2 BMI Smoking Status 98% 32.6 kg/m2 Never Smoker Vitals History BMI and BSA Data Body Mass Index Body Surface Area  
 32.6 kg/m 2 2.35 m 2 Preferred Pharmacy Pharmacy Name Phone [phone] [facility] [phone] Your Updated Medication List  
  
   
This list is accurate as of 3/7/18  6:27 PM.  Always use your most recent med list.  
  
  
  
  
 trimethoprim-sulfamethoxazole 160-800 mg per tablet Commonly known as:  BACTRIM DS, SEPTRA DS  
 Take 1 Tab by mouth two (2) times a day for 10 days. Prescriptions Sent to Pharmacy Refills  
 trimethoprim-sulfamethoxazole (BACTRIM DS, SEPTRA DS) 160-800 mg per tablet 0 Sig: Take 1 Tab by mouth two (2) times a day for 10 days. Class: Normal  
 Pharmacy: [facility], [address] [place] [address] #: [phone] Route: Oral  
  
We Performed the Following REFERRAL TO PRIMARY CARE [INU782 CPT(R)] Comments:  
 Please evaluate patient to establish primary care. Follow-up Instructions Return if symptoms worsen or fail to improve. Referral Information Referral ID Referred By Referred To  
  
 [id number] [address], [facility] [address] [place], [address] Phone: [phone] Fax: [phone] Visits Status Start Date End Date 1 New Request 3/7/18 3/7/19 If your referral has a status of pending review or denied, additional information will be sent to support the outcome of this decision. Patient Instructions Cuts: Care Instructions Your Care Instructions A cut can happen anywhere on your body. Stitches, staples, skin adhesives, or pieces of tape called Steri-Strips are sometimes used to keep the edges of a cut together and help it heal. Steri-Strips can be used by themselves or with stitches or staples. Sometimes cuts are left open. If the cut went deep and through the skin, the doctor may have closed the cut in two layers. A deeper layer of stitches brings the deep part of the cut together. These stitches will dissolve and don't need to be removed. The upper layer closure, which could be stitches, staples, Steri-Strips, or adhesive, is what you see on the cut. A cut is often covered by a bandage. The doctor has checked you carefully, but problems can develop later.  If you notice any problems or new symptoms, get medical treatment right away. Follow-up care is a key part of your treatment and safety. Be sure to make and go to all appointments, and call your doctor if you are having problems. It's also a good idea to know your test results and keep a list of the medicines you take. How can you care for yourself at home? If a cut is open or closed · Prop up the sore area on a pillow anytime you sit or lie down during the next 3 days. Try to keep it above the level of your heart. This will help reduce swelling. · Keep the cut dry for the first 24 to 48 hours. After this, you can shower if your doctor okays it. Pat the cut dry. · Don't soak the cut, such as in a bathtub. Your doctor will tell you when it's safe to get the cut wet. · After the first 24 to 48 hours, clean the cut with soap and water 2 times a day unless your doctor gives you different instructions. ¨ Don't use hydrogen peroxide or alcohol, which can slow healing. ¨ You may cover the cut with a thin layer of petroleum jelly and a nonstick bandage. ¨ If the doctor put a bandage over the cut, put on a new bandage after cleaning the cut or if the bandage gets wet or dirty. · Avoid any activity that could cause your cut to reopen. · Be safe with medicines. Read and follow all instructions on the label. ¨ If the doctor gave you a prescription medicine for pain, take it as prescribed. ¨ If you are not taking a prescription pain medicine, ask your doctor if you can take an over-the-counter medicine. If the cut is closed with stitches, staples, or Steri-Strips · Follow the above instructions for open or closed cuts. · Do not remove the stitches or staples on your own. Your doctor will tell you when to come back to have the stitches or staples removed. · Leave Steri-Strips on until they fall off. If the cut is closed with a skin adhesive · Follow the above instructions for open or closed cuts. · Leave the skin adhesive on your skin until it falls off on its own. This may take 5 to 10 days. · Do not scratch, rub, or pick at the adhesive. · Do not put the sticky part of a bandage directly on the adhesive. · Do not put any kind of ointment, cream, or lotion over the area. This can make the adhesive fall off too soon. Do not use hydrogen peroxide or alcohol, which can slow healing. When should you call for help? Call your doctor now or seek immediate medical care if: 
? · You have new pain, or your pain gets worse. ? · The skin near the cut is cold or pale or changes color. ? · You have tingling, weakness, or numbness near the cut.  
? · The cut starts to bleed, and blood soaks through the bandage. Oozing small amounts of blood is normal.  
? · You have trouble moving the area near the cut.  
? · You have symptoms of infection, such as: 
¨ Increased pain, swelling, warmth, or redness around the cut. ¨ Red streaks leading from the cut. ¨ Pus draining from the cut. ¨ A fever. ? Watch closely for changes in your health, and be sure to contact your doctor if: 
? · The cut reopens. ? · You do not get better as expected. Where can you learn more? Go to http://jazz-brandon.info/. Enter M735 in the search box to learn more about \"Cuts: Care Instructions. \" Current as of: March 20, 2017 Content Version: 11.4 © 4417-2255 [employer]bod. Care instructions adapted under license by [employer] (which disclaims liability or warranty for this information). If you have questions about a medical condition or this instruction, always ask your healthcare professional. [address] any warranty or liability for your use of this information. Introducing [facility] & HE[facility]! Sabiha Ellington introduces [employer] patient portal. Now you can access parts of your medical record, email your doctor's office, and request medication refills online. 1. In your internet browser, go to [email or web address] 2. Click on the First Time User? Click Here link in the Sign In box. You will see the New Member Sign Up page. 3. Enter your Kaymbu Access Code exactly as it appears below. You will not need to use this code after youve completed the sign-up process. If you do not sign up before the expiration date, you must request a new code. · Kaymbu Access Code: [email or web address] Expires: 3/29/2018 12:09 PM 
 
4. Enter the last four digits of your Social Security Number (xxxx) and Date of Birth (mm/dd/yyyy) as indicated and click Submit. You will be taken to the next sign-up page. 5. Create a ENTrigue Surgicalt ID. This will be your Kaymbu login ID and cannot be changed, so think of one that is secure and easy to remember. 6. Create a Kaymbu password. You can change your password at any time. 7. Enter your Password Reset Question and Answer. This can be used at a later time if you forget your password. 8. Enter your e-mail address. You will receive e-mail notification when new information is available in [address] 9. Click Sign Up. You can now view and download portions of your medical record. 10. Click the Download Summary menu link to download a portable copy of your medical information. If you have questions, please visit the Frequently Asked Questions section of the Kaymbu website. Remember, Kaymbu is NOT to be used for urgent needs. For medical emergencies, dial 911. Now available from your iPhone and Android! Please provide this summary of care documentation to your next provider. Your primary care clinician is listed as NONE. If you have any questions after today's visit, please call [phone].

## 2018-03-07 NOTE — PATIENT INSTRUCTIONS
Cuts: Care Instructions  Your Care Instructions  A cut can happen anywhere on your body. Stitches, staples, skin adhesives, or pieces of tape called Steri-Strips are sometimes used to keep the edges of a cut together and help it heal. Steri-Strips can be used by themselves or with stitches or staples. Sometimes cuts are left open. If the cut went deep and through the skin, the doctor may have closed the cut in two layers. A deeper layer of stitches brings the deep part of the cut together. These stitches will dissolve and don't need to be removed. The upper layer closure, which could be stitches, staples, Steri-Strips, or adhesive, is what you see on the cut. A cut is often covered by a bandage. The doctor has checked you carefully, but problems can develop later. If you notice any problems or new symptoms, get medical treatment right away. Follow-up care is a key part of your treatment and safety. Be sure to make and go to all appointments, and call your doctor if you are having problems. It's also a good idea to know your test results and keep a list of the medicines you take. How can you care for yourself at home? If a cut is open or closed  · Prop up the sore area on a pillow anytime you sit or lie down during the next 3 days. Try to keep it above the level of your heart. This will help reduce swelling. · Keep the cut dry for the first 24 to 48 hours. After this, you can shower if your doctor okays it. Pat the cut dry. · Don't soak the cut, such as in a bathtub. Your doctor will tell you when it's safe to get the cut wet. · After the first 24 to 48 hours, clean the cut with soap and water 2 times a day unless your doctor gives you different instructions. ¨ Don't use hydrogen peroxide or alcohol, which can slow healing. ¨ You may cover the cut with a thin layer of petroleum jelly and a nonstick bandage.   ¨ If the doctor put a bandage over the cut, put on a new bandage after cleaning the cut or if the bandage gets wet or dirty. · Avoid any activity that could cause your cut to reopen. · Be safe with medicines. Read and follow all instructions on the label. ¨ If the doctor gave you a prescription medicine for pain, take it as prescribed. ¨ If you are not taking a prescription pain medicine, ask your doctor if you can take an over-the-counter medicine. If the cut is closed with stitches, staples, or Steri-Strips  · Follow the above instructions for open or closed cuts. · Do not remove the stitches or staples on your own. Your doctor will tell you when to come back to have the stitches or staples removed. · Leave Steri-Strips on until they fall off. If the cut is closed with a skin adhesive  · Follow the above instructions for open or closed cuts. · Leave the skin adhesive on your skin until it falls off on its own. This may take 5 to 10 days. · Do not scratch, rub, or pick at the adhesive. · Do not put the sticky part of a bandage directly on the adhesive. · Do not put any kind of ointment, cream, or lotion over the area. This can make the adhesive fall off too soon. Do not use hydrogen peroxide or alcohol, which can slow healing. When should you call for help? Call your doctor now or seek immediate medical care if:  ? · You have new pain, or your pain gets worse. ? · The skin near the cut is cold or pale or changes color. ? · You have tingling, weakness, or numbness near the cut.   ? · The cut starts to bleed, and blood soaks through the bandage. Oozing small amounts of blood is normal.   ? · You have trouble moving the area near the cut.   ? · You have symptoms of infection, such as:  ¨ Increased pain, swelling, warmth, or redness around the cut. ¨ Red streaks leading from the cut. ¨ Pus draining from the cut. ¨ A fever. ? Watch closely for changes in your health, and be sure to contact your doctor if:  ? · The cut reopens. ? · You do not get better as expected.    Where can you learn more? Go to http://jazz-brandon.info/. Enter M735 in the search box to learn more about \"Cuts: Care Instructions. \"  Current as of: March 20, 2017  Content Version: 11.4  © 2796-2333 comment.com. Care instructions adapted under license by [employer] (which disclaims liability or warranty for this information). If you have questions about a medical condition or this instruction, always ask your healthcare professional. [address] any warranty or liability for your use of this information.

## 2018-03-07 NOTE — PROGRESS NOTES
Chief Complaint   Patient presents with    Finger Swelling     Friday morning accidently cut right middle finger with a knife.  Used neosporin,bandaid and now swollen and painful

## 2018-03-07 NOTE — PROGRESS NOTES
HISTORY OF PRESENT ILLNESS  Alaina Reza is a 34 y.o. male. HPI  Chief Complaint   Patient presents with    Finger Swelling     Friday morning accidently cut right middle finger with a knife. Used neosporin,bandaid and now swollen and painful     Pt presents for pain and swelling of right middle finger. 5 days ago while removing a tag from clothing with a \"blade\"/personal large pocket knife, he cut his middle finger. He's been trying to keep it clean and covered while at gym. However in the last couple days the finger has become swollen and tender. Tetanus status: > 10-15 yrs ago. History reviewed. No pertinent past medical history. History reviewed. No pertinent surgical history. Allergies   Allergen Reactions    Codeine Hives    Codeine Other (comments)     bradycardia    Shellfish Derived Nausea and Vomiting       ROS  A comprehensive review of systems was obtained and negative except findings in the HPI    Physical Exam  Healing laceration to proximal middle finger, palmar aspect. Wound edges well approximated and no discharge. Edema and tenderness noted to finger proximal to laceration. ASSESSMENT and PLAN    ICD-10-CM ICD-9-CM    1. Laceration of right middle finger without foreign body without damage to nail, initial encounter S61.212A 883.0 REFERRAL TO PRIMARY CARE      TETANUS, DIPHTHERIA TOXOIDS AND ACELLULAR PERTUSSIS VACCINE (TDAP), IN INDIVIDS. >=7, IM   2. Open wound of finger, infected, initial encounter S61.209A 883.1 REFERRAL TO PRIMARY CARE    L08.9       Orders Placed This Encounter    TETANUS, DIPHTHERIA TOXOIDS AND ACELLULAR PERTUSSIS VACCINE (TDAP), IN INDIVIDS. >=7, IM    REFERRAL TO PRIMARY CARE    trimethoprim-sulfamethoxazole (BACTRIM DS, SEPTRA DS) 160-800 mg per tablet     Tdap in office today. Discussed wound care, elevated hand, cover while at gym. RTC if no improvement and PRN. Fauzia Black NP  This note will not be viewable in [address]

## 2018-03-07 NOTE — PROGRESS NOTES
Sidney Bernabe  is a 34 y.o.  male  who present for Tdap immunizations/injections. He denies any symptoms , reactions or allergies that would exclude them from being immunized today. Injection given in left deltoid. Verbal order given for vaccine by Rod Smith NP. Risks and adverse reactions were discussed and the VIS was given if applicable to them. All questions were addressed. He was observed for 10  min post injection. There were no reactions observed.     Perla Seals LPN

## 2018-10-04 ENCOUNTER — OFFICE VISIT (OUTPATIENT)
Dept: PRIMARY CARE CLINIC | Age: 33
End: 2018-10-04

## 2018-10-04 VITALS
DIASTOLIC BLOOD PRESSURE: 69 MMHG | OXYGEN SATURATION: 98 % | BODY MASS INDEX: 28.58 KG/M2 | HEIGHT: 72 IN | HEART RATE: 91 BPM | TEMPERATURE: 98.4 F | WEIGHT: 211 LBS | SYSTOLIC BLOOD PRESSURE: 107 MMHG | RESPIRATION RATE: 18 BRPM

## 2018-10-04 DIAGNOSIS — J01.01 ACUTE RECURRENT MAXILLARY SINUSITIS: Primary | ICD-10-CM

## 2018-10-04 RX ORDER — BENZONATATE 200 MG/1
200 CAPSULE ORAL
Qty: 30 CAP | Refills: 0 | Status: SHIPPED | OUTPATIENT
Start: 2018-10-04 | End: 2018-10-11

## 2018-10-04 RX ORDER — AMOXICILLIN AND CLAVULANATE POTASSIUM 875; 125 MG/1; MG/1
1 TABLET, FILM COATED ORAL 2 TIMES DAILY
Qty: 14 TAB | Refills: 0 | Status: SHIPPED | OUTPATIENT
Start: 2018-10-04 | End: 2018-10-11

## 2018-10-04 NOTE — PATIENT INSTRUCTIONS
Sinusitis: Care Instructions Your Care Instructions Sinusitis is an infection of the lining of the sinus cavities in your head. Sinusitis often follows a cold. It causes pain and pressure in your head and face. In most cases, sinusitis gets better on its own in 1 to 2 weeks. But some mild symptoms may last for several weeks. Sometimes antibiotics are needed. Follow-up care is a key part of your treatment and safety. Be sure to make and go to all appointments, and call your doctor if you are having problems. It's also a good idea to know your test results and keep a list of the medicines you take. How can you care for yourself at home? · Take an over-the-counter pain medicine, such as acetaminophen (Tylenol), ibuprofen (Advil, Motrin), or naproxen (Aleve). Read and follow all instructions on the label. · If the doctor prescribed antibiotics, take them as directed. Do not stop taking them just because you feel better. You need to take the full course of antibiotics. · Be careful when taking over-the-counter cold or flu medicines and Tylenol at the same time. Many of these medicines have acetaminophen, which is Tylenol. Read the labels to make sure that you are not taking more than the recommended dose. Too much acetaminophen (Tylenol) can be harmful. · Breathe warm, moist air from a steamy shower, a hot bath, or a sink filled with hot water. Avoid cold, dry air. Using a humidifier in your home may help. Follow the directions for cleaning the machine. · Use saline (saltwater) nasal washes to help keep your nasal passages open and wash out mucus and bacteria. You can buy saline nose drops at a grocery store or drugstore. Or you can make your own at home by adding 1 teaspoon of salt and 1 teaspoon of baking soda to 2 cups of distilled water. If you make your own, fill a bulb syringe with the solution, insert the tip into your nostril, and squeeze gently. Girish Lauren your nose. · Put a hot, wet towel or a warm gel pack on your face 3 or 4 times a day for 5 to 10 minutes each time. · Try a decongestant nasal spray like oxymetazoline (Afrin). Do not use it for more than 3 days in a row. Using it for more than 3 days can make your congestion worse. When should you call for help? Call your doctor now or seek immediate medical care if: 
  · You have new or worse swelling or redness in your face or around your eyes.  
  · You have a new or higher fever.  
 Watch closely for changes in your health, and be sure to contact your doctor if: 
  · You have new or worse facial pain.  
  · The mucus from your nose becomes thicker (like pus) or has new blood in it.  
  · You are not getting better as expected. Where can you learn more? Go to http://jazz-brandon.info/. Enter T423 in the search box to learn more about \"Sinusitis: Care Instructions. \" Current as of: March 28, 2018 Content Version: 11.8 © 6167-7205 [employer]. Care instructions adapted under license by [employer] (which disclaims liability or warranty for this information). If you have questions about a medical condition or this instruction, always ask your healthcare professional. [address] any warranty or liability for your use of this information.

## 2018-10-04 NOTE — PROGRESS NOTES
Subjective:  
Kiana Hood is a 34 y.o. male who complains of congestion, sore throat, nasal blockage, post nasal drip, dry cough and headache for 10 days, gradually worsening since that time. he has yellow and green mucous and sputum. His main complaint today is severe sinus pain and pressure which has worsened over the past 24 hours. He denies a history of shortness of breath and wheezing. Evaluation to date: none. Treatment to date: decongestants, OTC products. Patient does not smoke cigarettes. Relevant PMH: No pertinent additional PMH. There is no problem list on file for this patient. There are no active problems to display for this patient. Allergies Allergen Reactions  Codeine Hives  Codeine Other (comments) bradycardia  Shellfish Derived Nausea and Vomiting History reviewed. No pertinent past medical history. History reviewed. No pertinent surgical history. Family History Problem Relation Age of Onset  Diabetes Father Social History Substance Use Topics  Smoking status: Never Smoker  Smokeless tobacco: Never Used  Alcohol use Yes Comment: occasional cigars Review of Systems Pertinent items are noted in HPI. Objective:  
 
Visit Vitals  /69 (BP 1 Location: Left arm, BP Patient Position: Sitting)  Pulse 91  Temp 98.4 °F (36.9 °C) (Oral)  Resp 18  Ht 6' (1.829 m)  SpO2 98% General:  alert, cooperative, no distress Eyes: negative Ears: normal TM's and external ear canals AU Sinuses: tenderness over bilateral maxillary. Yellow purulent nasal discharge present. Mouth:  Lips, mucosa, and tongue normal. Teeth and gums normal and abnormal findings: moderate oropharyngeal erythema Neck: supple, symmetrical, trachea midline and no adenopathy. Heart: S1 and S2 normal, no murmurs noted. Lungs: clear to auscultation bilaterally Abdomen: soft, non-tender.  Bowel sounds normal. No masses,  no organomegaly Assessment/Plan:  
sinusitis Discussed the dx and tx of sinusitis. Suggested symptomatic OTC remedies. Antibiotics per orders. RTC prn.

## 2018-10-04 NOTE — PROGRESS NOTES
Chief Complaint Patient presents with  Cold Symptoms  
pt c/o cough, nasal congestion, and sinus pressure x 1 week, pt states he has tried dayquil,nyquil and sudafed to help with discomfort. This note will not be viewable in [address]

## 2018-10-04 NOTE — MR AVS SNAPSHOT
Yasmine Chao 
 
 
 [address] [place] [address] 
[phone] Patient: Conrado Acosta MRN: [id number] :1985 Visit Information Date & Time Provider Department Dept. Phone Encounter #  
 10/4/2018  3:00 PM [facility]  [facility] [phone] Follow-up Instructions Return if symptoms worsen or fail to improve. Upcoming Health Maintenance Date Due Influenza Age 5 to Adult 3/31/2019* DTaP/Tdap/Td series (2 - Td) 3/7/2028 *Topic was postponed. The date shown is not the original due date. Allergies as of 10/4/2018  Review Complete On: 10/4/2018 By: Cassandra Yeager LPN Severity Noted Reaction Type Reactions Codeine  10/28/2015    Hives Codeine  2017    Other (comments) bradycardia Shellfish Derived  10/28/2015    Nausea and Vomiting Current Immunizations  Reviewed on 3/7/2018 Name Date Tdap 3/7/2018 Not reviewed this visit You Were Diagnosed With   
  
 Codes Comments Acute recurrent maxillary sinusitis    -  Primary ICD-10-CM: J01.01 
ICD-9-CM: 461.0 Vitals BP Pulse Temp Resp Height(growth percentile) SpO2  
 107/69 (BP 1 Location: Left arm, BP Patient Position: Sitting) 91 98.4 °F (36.9 °C) (Oral) 18 6' (1.829 m) 98% Smoking Status Never Smoker Preferred Pharmacy Pharmacy Name Phone [id number] [id number] [facility] [phone] Your Updated Medication List  
  
   
This list is accurate as of 10/4/18  3:31 PM.  Always use your most recent med list.  
  
  
  
  
 amoxicillin-clavulanate 875-125 mg per tablet Commonly known as:  AUGMENTIN Take 1 Tab by mouth two (2) times a day for 7 days. benzonatate 200 mg capsule Commonly known as:  TESSALON  
 Take 1 Cap by mouth three (3) times daily as needed for Cough for up to 7 days. Prescriptions Sent to Pharmacy Refills  
 amoxicillin-clavulanate (AUGMENTIN) 875-125 mg per tablet 0 Sig: Take 1 Tab by mouth two (2) times a day for 7 days. Class: Normal  
 Pharmacy: [address] Ph #: [phone] Route: Oral  
 benzonatate (TESSALON) 200 mg capsule 0 Sig: Take 1 Cap by mouth three (3) times daily as needed for Cough for up to 7 days. Class: Normal  
 Pharmacy: [address] Ph #: [phone] Route: Oral  
  
Follow-up Instructions Return if symptoms worsen or fail to improve. Patient Instructions Sinusitis: Care Instructions Your Care Instructions Sinusitis is an infection of the lining of the sinus cavities in your head. Sinusitis often follows a cold. It causes pain and pressure in your head and face. In most cases, sinusitis gets better on its own in 1 to 2 weeks. But some mild symptoms may last for several weeks. Sometimes antibiotics are needed. Follow-up care is a key part of your treatment and safety. Be sure to make and go to all appointments, and call your doctor if you are having problems. It's also a good idea to know your test results and keep a list of the medicines you take. How can you care for yourself at home? · Take an over-the-counter pain medicine, such as acetaminophen (Tylenol), ibuprofen (Advil, Motrin), or naproxen (Aleve). Read and follow all instructions on the label. · If the doctor prescribed antibiotics, take them as directed. Do not stop taking them just because you feel better. You need to take the full course of antibiotics. · Be careful when taking over-the-counter cold or flu medicines and Tylenol at the same time.  Many of these medicines have acetaminophen, which is Tylenol. Read the labels to make sure that you are not taking more than the recommended dose. Too much acetaminophen (Tylenol) can be harmful. · Breathe warm, moist air from a steamy shower, a hot bath, or a sink filled with hot water. Avoid cold, dry air. Using a humidifier in your home may help. Follow the directions for cleaning the machine. · Use saline (saltwater) nasal washes to help keep your nasal passages open and wash out mucus and bacteria. You can buy saline nose drops at a grocery store or [employer]. Or you can make your own at home by adding 1 teaspoon of salt and 1 teaspoon of baking soda to 2 cups of distilled water. If you make your own, fill a bulb syringe with the solution, insert the tip into your nostril, and squeeze gently. Eliel Alton your nose. · Put a hot, wet towel or a warm gel pack on your face 3 or 4 times a day for 5 to 10 minutes each time. · Try a decongestant nasal spray like oxymetazoline (Afrin). Do not use it for more than 3 days in a row. Using it for more than 3 days can make your congestion worse. When should you call for help? Call your doctor now or seek immediate medical care if: 
  · You have new or worse swelling or redness in your face or around your eyes.  
  · You have a new or higher fever.  
 Watch closely for changes in your health, and be sure to contact your doctor if: 
  · You have new or worse facial pain.  
  · The mucus from your nose becomes thicker (like pus) or has new blood in it.  
  · You are not getting better as expected. Where can you learn more? Go to http://jazz-brandon.info/. Enter A221 in the search box to learn more about \"Sinusitis: Care Instructions. \" Current as of: March 28, 2018 Content Version: 11.8 © 6101-6700 [employer].  Care instructions adapted under license by [employer] (which disclaims liability or warranty for this information). If you have questions about a medical condition or this instruction, always ask your healthcare professional. [address] any warranty or liability for your use of this information. Introducing [facility] SERVICES! [employer] introduces Webroot patient portal. Now you can access parts of your medical record, email your doctor's office, and request medication refills online. 1. In your internet browser, go to [email or web address] 2. Click on the First Time User? Click Here link in the Sign In box. You will see the New Member Sign Up page. 3. Enter your Webroot Access Code exactly as it appears below. You will not need to use this code after youve completed the sign-up process. If you do not sign up before the expiration date, you must request a new code. · Webroot Access Code: [email or web address] Expires: 1/2/2019  3:31 PM 
 
4. Enter the last four digits of your Social Security Number (xxxx) and Date of Birth (mm/dd/yyyy) as indicated and click Submit. You will be taken to the next sign-up page. 5. Create a Webroot ID. This will be your Webroot login ID and cannot be changed, so think of one that is secure and easy to remember. 6. Create a Webroot password. You can change your password at any time. 7. Enter your Password Reset Question and Answer. This can be used at a later time if you forget your password. 8. Enter your e-mail address. You will receive e-mail notification when new information is available in [address] 9. Click Sign Up. You can now view and download portions of your medical record. 10. Click the Download Summary menu link to download a portable copy of your medical information. If you have questions, please visit the Frequently Asked Questions section of the Webroot website. Remember, Webroot is NOT to be used for urgent needs. For medical emergencies, dial 911. Now available from your iPhone and Android! Please provide this summary of care documentation to your next provider. Your primary care clinician is listed as NONE. If you have any questions after today's visit, please call [phone].

## 2018-11-12 ENCOUNTER — HOSPITAL ENCOUNTER (OUTPATIENT)
Dept: MRI IMAGING | Age: 33
Discharge: HOME | End: 2018-11-12
Attending: ORTHOPAEDIC SURGERY
Payer: COMMERCIAL

## 2018-11-12 DIAGNOSIS — M25.562 LEFT KNEE PAIN: ICD-10-CM

## 2018-11-12 PROCEDURE — 73721 MRI JNT OF LWR EXTRE W/O DYE: CPT

## 2018-11-28 ENCOUNTER — HOSPITAL ENCOUNTER (OUTPATIENT)
Dept: PHYSICAL THERAPY | Age: 33
Discharge: HOME | End: 2018-11-28
Payer: COMMERCIAL

## 2018-11-28 PROCEDURE — 97110 THERAPEUTIC EXERCISES: CPT | Performed by: PHYSICAL THERAPIST

## 2018-11-28 PROCEDURE — 97016 VASOPNEUMATIC DEVICE THERAPY: CPT | Performed by: PHYSICAL THERAPIST

## 2018-11-28 PROCEDURE — 97162 PT EVAL MOD COMPLEX 30 MIN: CPT | Performed by: PHYSICAL THERAPIST

## 2018-11-28 NOTE — PROGRESS NOTES
PT INITIAL EVALUATION NOTE 2-15    Patient Name: Conrado Acosta  Date:2018  : 1985  [x]  Patient  Verified  Payor: Airam Schuster / Plan: Bridget Query / Product Type: PPO /    In time: 3:40pm  Out time: 4:40pm  Total Treatment Time (min): 60  Visit #: 1     Treatment Area: Left knee pain [M25.562]    SUBJECTIVE  Pain Level (0-10 scale): 6/10 (3-9/10)  Any medication changes, allergies to medications, adverse drug reactions, diagnosis change, or new procedure performed?: [] No    [x] Yes (see summary sheet for update)  Subjective: The patient has done [employer] for 4-5 years. He injured his left knee on  while his partner was performing a knee reap maneuver. MRI shows MCL and possible meniscal involvement. He can only be on his feet for an hour until it really bothers him. About 20-30minutes of sitting still stiffen his leg and ache. He's sprained his right ankle ligaments last year. OBJECTIVE/EXAMINATION  Posture: Forward head, scapular protraction bilaterally  Other Observations: Pt. Does not want to fully extend left knee  Gait and Functional Mobility:  Minimal antalgic gait  Palpation: very tender to palpate medial joint line of left knee    A/PROM Right Knee: Flex 140/145   Ext 5 degrees hyperextension  A/PROM Left Knee: Flex 120, p!/122, p! Ext -10/-8    Flexibility: tight hamstring/calf on left    LOWER QUARTER   MUSCLE STRENGTH  KEY       R  L  0 - No Contraction  Knee ext  5  3-, p!  1 - Trace            flex  5  4, p!  2 - Poor   Hip ext   nt  nt  3 - Fair          flex   5  5  4 - Good         abd  4-  3+  5 - Normal         add  nt  nt      Ankle DF  5  5                PF  5  5                INV  5  3+, p! EV  5  4    Neurological: Reflexes / Sensations: WFL  Special Tests:       Knee Varus/Valgus Stress: + for MCL        Anterior Drawer: + for pain but no increase in laxity;  Posterior Drawer: negative    Modality rationale: decrease edema, decrease inflammation and decrease pain to improve the patients ability to perform daily activities. Type Additional Details   [] Estim: []Att   []Unatt        []TENS instruct                  []IFC  []Premod   []NMES                     []Other:  []w/US   []w/ice   []w/heat  Position:  Location:   []  Traction: [] Cervical       []Lumbar                       [] Prone          []Supine                       []Intermittent   []Continuous Lbs:  [] before manual  [] after manual  []w/heat   []  Ultrasound: []Continuous   [] Pulsed at:                            []1MHz   []3MHz Location:  W/cm2:   []  Paraffin         Location:  []w/heat   []  Ice     []  Heat  []  Ice massage Position:  Location:   []  Laser  []  Other: Position:  Location:   [x]  Vasopneumatic Device 15min Pressure:       [] lo [] med [x] hi   Temperature: 34   [x] Skin assessment post-treatment:  [x]intact []redness- no adverse reaction    []redness - adverse reaction:     20 min Therapeutic Exercise:  [x] See flow sheet :   Rationale: increase ROM, increase strength and improve coordination to improve the patients ability to perform daily activities.            With   [x] TE   [] TA   [] neuro   [] other: Patient Education: [x] Review HEP    [x] Progressed/Changed HEP based on:   [x] positioning   [x] body mechanics   [] transfers   [] heat/ice application    [] other:        Other Objective/Functional Measures: FOTO= 40    Pain Level (0-10 scale) post treatment: 6      ASSESSMENT:      [x]  See Plan of [address] Amarjit, PT 11/28/2018  3:41 PM

## 2018-11-28 NOTE — PROGRESS NOTES
[address] [address] (MOB IV), [address] [facility], [place], operated by [facility],  [address]  Phone: [phone] Fax: [phone]    Plan of Care/Statement of Necessity for Physical Therapy Services  2-15    Patient name: Praveena rByson  : 1985  Provider#: [id number]  Referral source: Loli York MD      Medical/Treatment Diagnosis: Left knee pain [M25.562]     Prior Hospitalization: see medical history     Comorbidities: back pain, previous accidents (Lacinda Bulmaro injuries including shoulder)  Prior Level of Function: 20 min of exercise at least 3x/wk  Medications: Verified on Patient Summary List    Start of Care: 18      Onset Date: 18       The Plan of Care and following information is based on the information from the initial evaluation. Assessment/ key information: The patient presents with a chief complaint of left knee pain after he was put into a \"knee reap\" maneuver during [address] on [address]. MRI confirms at least an MCL sprain. He is lacking 15 degrees of AROM knee extension (he hyperextends 5 degrees on the contralateral side), and is lacking 20 degrees of flexion versus the contralateral side due to pain. He is able to squat past 90 degrees knee flexion, but has significant crepitus and pain with palpation to the medial joint line, all indicating probable meniscal involvement (MRI will be re-evaluated tomorrow by Dr. Aron Ramirez). The patient will benefit from guided therapeutic interventions such as therex for strengthening and neuromuscular re-eduction, manual techniques for joint mobility and soft tissue extensibility, and modalities for pain management in order to improve functional mobility with daily activities.     Evaluation Complexity History MEDIUM  Complexity : 1-2 comorbidities / personal factors will impact the outcome/ POC ; Examination HIGH Complexity : 4+ Standardized tests and measures addressing body structure, function, activity limitation and / or participation in recreation  ;Presentation MEDIUM Complexity : Evolving with changing characteristics  ; Clinical Decision Making MEDIUM Complexity : FOTO score of 26-74  Overall Complexity Rating: MEDIUM    Problem List: pain affecting function, decrease ROM, decrease strength, edema affecting function, impaired gait/ balance, decrease ADL/ functional abilitiies, decrease activity tolerance, decrease flexibility/ joint mobility and decrease transfer abilities   Treatment Plan may include any combination of the following: Therapeutic exercise, Therapeutic activities, Neuromuscular re-education, Physical agent/modality, Gait/balance training, Manual therapy, Patient education, Self Care training, Functional mobility training, Home safety training and Stair training  Patient / Family readiness to learn indicated by: asking questions, trying to perform skills and interest  Persons(s) to be included in education: patient (P)  Barriers to Learning/Limitations: None  Patient Goal (s): to not have pain  Patient Self Reported Health Status: good  Rehabilitation Potential: good    Short Term Goals: To be accomplished in 2 treatments:                         1.) The patient will be independent with their HEP consistently for at least one week. Long Term Goals: To be accomplished in 16 treatments:                         1.) The patient will have at most 3/10 pain with daily activities. 2.) The patient will be able to ambulate for at least 60 min without having to change positions due to pain. 3.) The patient will improve their FOTO score from 40 to at least 50 to show improvements in functional mobility. 4.) The patient will improve his PROM to at least 0-135 to improve functional mobility with daily activities. Frequency / Duration: Patient to be seen 2 times per week for 16 treatments.       Patient/ Caregiver education and instruction: self care, activity modification, brace/ splint application and exercises    [x]  Plan of care has been reviewed with ELAYNE Han, PT 11/28/2018 4:59 PM    ________________________________________________________________________    I certify that the above Therapy Services are being furnished while the patient is under my care. I agree with the treatment plan and certify that this therapy is necessary.     [de-identified] Signature:____________________  Date:____________Time: _________

## 2018-12-03 ENCOUNTER — APPOINTMENT (OUTPATIENT)
Dept: PHYSICAL THERAPY | Age: 33
End: 2018-12-03

## 2018-12-05 ENCOUNTER — APPOINTMENT (OUTPATIENT)
Dept: PHYSICAL THERAPY | Age: 33
End: 2018-12-05

## 2018-12-11 ENCOUNTER — APPOINTMENT (OUTPATIENT)
Dept: PHYSICAL THERAPY | Age: 33
End: 2018-12-11

## 2018-12-13 ENCOUNTER — APPOINTMENT (OUTPATIENT)
Dept: PHYSICAL THERAPY | Age: 33
End: 2018-12-13

## 2018-12-17 ENCOUNTER — APPOINTMENT (OUTPATIENT)
Dept: PHYSICAL THERAPY | Age: 33
End: 2018-12-17

## 2018-12-19 ENCOUNTER — APPOINTMENT (OUTPATIENT)
Dept: PHYSICAL THERAPY | Age: 33
End: 2018-12-19

## 2018-12-27 ENCOUNTER — APPOINTMENT (OUTPATIENT)
Dept: PHYSICAL THERAPY | Age: 33
End: 2018-12-27

## 2019-03-12 ENCOUNTER — OFFICE VISIT (OUTPATIENT)
Dept: PRIMARY CARE CLINIC | Age: 34
End: 2019-03-12

## 2019-03-12 VITALS
HEART RATE: 54 BPM | WEIGHT: 215.6 LBS | SYSTOLIC BLOOD PRESSURE: 114 MMHG | BODY MASS INDEX: 29.2 KG/M2 | HEIGHT: 72 IN | OXYGEN SATURATION: 97 % | RESPIRATION RATE: 16 BRPM | TEMPERATURE: 98 F | DIASTOLIC BLOOD PRESSURE: 77 MMHG

## 2019-03-12 DIAGNOSIS — J01.00 ACUTE MAXILLARY SINUSITIS, RECURRENCE NOT SPECIFIED: Primary | ICD-10-CM

## 2019-03-12 RX ORDER — DOXYCYCLINE 100 MG/1
100 CAPSULE ORAL 2 TIMES DAILY
Qty: 14 CAP | Refills: 0 | Status: SHIPPED | OUTPATIENT
Start: 2019-03-12 | End: 2019-03-19

## 2019-03-12 NOTE — PATIENT INSTRUCTIONS
Sinusitis: Care Instructions  Your Care Instructions    Sinusitis is an infection of the lining of the sinus cavities in your head. Sinusitis often follows a cold. It causes pain and pressure in your head and face. In most cases, sinusitis gets better on its own in 1 to 2 weeks. But some mild symptoms may last for several weeks. Sometimes antibiotics are needed. Follow-up care is a key part of your treatment and safety. Be sure to make and go to all appointments, and call your doctor if you are having problems. It's also a good idea to know your test results and keep a list of the medicines you take. How can you care for yourself at home? · Take an over-the-counter pain medicine, such as acetaminophen (Tylenol), ibuprofen (Advil, Motrin), or naproxen (Aleve). Read and follow all instructions on the label. · If the doctor prescribed antibiotics, take them as directed. Do not stop taking them just because you feel better. You need to take the full course of antibiotics. · Be careful when taking over-the-counter cold or flu medicines and Tylenol at the same time. Many of these medicines have acetaminophen, which is Tylenol. Read the labels to make sure that you are not taking more than the recommended dose. Too much acetaminophen (Tylenol) can be harmful. · Breathe warm, moist air from a steamy shower, a hot bath, or a sink filled with hot water. Avoid cold, dry air. Using a humidifier in your home may help. Follow the directions for cleaning the machine. · Use saline (saltwater) nasal washes to help keep your nasal passages open and wash out mucus and bacteria. You can buy saline nose drops at a grocery store or drugstore. Or you can make your own at home by adding 1 teaspoon of salt and 1 teaspoon of baking soda to 2 cups of distilled water. If you make your own, fill a bulb syringe with the solution, insert the tip into your nostril, and squeeze gently. Valentine Carp your nose.   · Put a hot, wet towel or a warm gel pack on your face 3 or 4 times a day for 5 to 10 minutes each time. · Try a decongestant nasal spray like oxymetazoline (Afrin). Do not use it for more than 3 days in a row. Using it for more than 3 days can make your congestion worse. When should you call for help? Call your doctor now or seek immediate medical care if:    · You have new or worse swelling or redness in your face or around your eyes.     · You have a new or higher fever.    Watch closely for changes in your health, and be sure to contact your doctor if:    · You have new or worse facial pain.     · The mucus from your nose becomes thicker (like pus) or has new blood in it.     · You are not getting better as expected. Where can you learn more? Go to http://jazz-brandon.info/. Enter B481 in the search box to learn more about \"Sinusitis: Care Instructions. \"  Current as of: March 27, 2018  Content Version: 11.9  © 5557-7399 [employer]. Care instructions adapted under license by [employer] (which disclaims liability or warranty for this information). If you have questions about a medical condition or this instruction, always ask your healthcare professional. [place] [address] any warranty or liability for your use of this information.

## 2019-03-12 NOTE — PROGRESS NOTES
Subjective:   Yehuda Minaya is a 30 y.o. male who complains of congestion, sneezing, bilateral sinus pain (x 2 days) and yellow nasal discharge for 5-6 days, gradually worsening since that time. Reports severe sinus pain (\"drilling sensation) in face. Denies any fevers or cough. History of frequent sinusitis. He denies a history of shortness of breath and wheezing. Evaluation to date: none. Treatment to date: decongestants. Patient does not smoke cigarettes. Relevant PMH: History reviewed. No pertinent past medical history. History reviewed. No pertinent surgical history. Allergies   Allergen Reactions    Codeine Hives    Codeine Other (comments)     bradycardia    Shellfish Derived Nausea and Vomiting         Review of Systems  Pertinent items are noted in HPI. Objective:     Visit Vitals  /77   Pulse (!) 54   Temp 98 °F (36.7 °C) (Oral)   Resp 16   Ht 6' (1.829 m)   Wt 215 lb 9.6 oz (97.8 kg)   SpO2 97%   BMI 29.24 kg/m²     General:  alert, cooperative, no distress   Eyes: negative   Ears: normal TM's and external ear canals AU   Sinuses: tenderness over bilateral maxillary    Mouth:  Lips, mucosa, and tongue normal. Teeth and gums normal and normal findings: oropharynx pink & moist without lesions or evidence of thrush   Neck: supple, symmetrical, trachea midline and no adenopathy. Heart: S1 and S2 normal, no murmurs noted. Lungs: clear to auscultation bilaterally        Assessment/Plan:       ICD-10-CM ICD-9-CM    1. Acute maxillary sinusitis, recurrence not specified J01.00 461.0        Discussed the dx and tx of sinusitis. Suggested symptomatic OTC remedies. Nasal saline sprays for congestion. Antibiotics per orders. RTC prn. Maday Powers NP  This note will not be viewable in [address]

## 2019-03-12 NOTE — PROGRESS NOTES
Chief Complaint   Patient presents with    Sinus Pain     Face pain, severe congestion, coughing up green and yellow, sneezing a lot

## 2020-01-06 ENCOUNTER — OFFICE VISIT (OUTPATIENT)
Dept: INTERNAL MEDICINE CLINIC | Age: 35
End: 2020-01-06

## 2020-01-06 VITALS
DIASTOLIC BLOOD PRESSURE: 68 MMHG | BODY MASS INDEX: 27.13 KG/M2 | OXYGEN SATURATION: 97 % | WEIGHT: 200.3 LBS | RESPIRATION RATE: 18 BRPM | HEIGHT: 72 IN | TEMPERATURE: 98.2 F | HEART RATE: 71 BPM | SYSTOLIC BLOOD PRESSURE: 100 MMHG

## 2020-01-06 DIAGNOSIS — F90.9 ATTENTION DEFICIT HYPERACTIVITY DISORDER (ADHD), UNSPECIFIED ADHD TYPE: Primary | ICD-10-CM

## 2020-01-06 NOTE — PROGRESS NOTES
1. Have you been to the ER, urgent care clinic since your last visit? Hospitalized since your last visit? No    2. Have you seen or consulted any other health care providers outside of the [address] [place] [address] since your last visit? Include any pap smears or colon screening.  No     Wants to discuss ADHD

## 2020-01-06 NOTE — PATIENT INSTRUCTIONS
Body Mass Index: Care Instructions  Your Care Instructions    Body mass index (BMI) can help you see if your weight is raising your risk for health problems. It uses a formula to compare how much you weigh with how tall you are. · A BMI lower than 18.5 is considered underweight. · A BMI between 18.5 and 24.9 is considered healthy. · A BMI between 25 and 29.9 is considered overweight. A BMI of 30 or higher is considered obese. If your BMI is in the normal range, it means that you have a lower risk for weight-related health problems. If your BMI is in the overweight or obese range, you may be at increased risk for weight-related health problems, such as high blood pressure, heart disease, stroke, arthritis or joint pain, and diabetes. If your BMI is in the underweight range, you may be at increased risk for health problems such as fatigue, lower protection (immunity) against illness, muscle loss, bone loss, hair loss, and hormone problems. BMI is just one measure of your risk for weight-related health problems. You may be at higher risk for health problems if you are not active, you eat an unhealthy diet, or you drink too much alcohol or use tobacco products. Follow-up care is a key part of your treatment and safety. Be sure to make and go to all appointments, and call your doctor if you are having problems. It's also a good idea to know your test results and keep a list of the medicines you take. How can you care for yourself at home? · Practice healthy eating habits. This includes eating plenty of fruits, vegetables, whole grains, lean protein, and low-fat dairy. · If your doctor recommends it, get more exercise. Walking is a good choice. Bit by bit, increase the amount you walk every day. Try for at least 30 minutes on most days of the week. · Do not smoke. Smoking can increase your risk for health problems. If you need help quitting, talk to your doctor about stop-smoking programs and medicines. These can increase your chances of quitting for good. · Limit alcohol to 2 drinks a day for men and 1 drink a day for women. Too much alcohol can cause health problems. If you have a BMI higher than 25  · Your doctor may do other tests to check your risk for weight-related health problems. This may include measuring the distance around your waist. A waist measurement of more than 40 inches in men or 35 inches in women can increase the risk of weight-related health problems. · Talk with your doctor about steps you can take to stay healthy or improve your health. You may need to make lifestyle changes to lose weight and stay healthy, such as changing your diet and getting regular exercise. If you have a BMI lower than 18.5  · Your doctor may do other tests to check your risk for health problems. · Talk with your doctor about steps you can take to stay healthy or improve your health. You may need to make lifestyle changes to gain or maintain weight and stay healthy, such as getting more healthy foods in your diet and doing exercises to build muscle. Where can you learn more? Go to http://jazz-brandon.info/. Enter S176 in the search box to learn more about \"Body Mass Index: Care Instructions. \"  Current as of: October 13, 2016  Content Version: 11.4  © 2238-4958 [employer]. Care instructions adapted under license by [employer] (which disclaims liability or warranty for this information). If you have questions about a medical condition or this instruction, always ask your healthcare professional. [address] any warranty or liability for your use of this information.

## 2020-01-07 LAB
ALBUMIN SERPL-MCNC: 4.5 G/DL (ref 3.5–5.5)
ALBUMIN/GLOB SERPL: 1.9 {RATIO} (ref 1.2–2.2)
ALP SERPL-CCNC: 89 IU/L (ref 39–117)
ALT SERPL-CCNC: 43 IU/L (ref 0–44)
APPEARANCE UR: CLEAR
AST SERPL-CCNC: 41 IU/L (ref 0–40)
BASOPHILS # BLD AUTO: 0.1 X10E3/UL (ref 0–0.2)
BASOPHILS NFR BLD AUTO: 1 %
BILIRUB SERPL-MCNC: 0.6 MG/DL (ref 0–1.2)
BILIRUB UR QL STRIP: NEGATIVE
BUN SERPL-MCNC: 10 MG/DL (ref 6–20)
BUN/CREAT SERPL: 11 (ref 9–20)
CALCIUM SERPL-MCNC: 9.5 MG/DL (ref 8.7–10.2)
CHLORIDE SERPL-SCNC: 102 MMOL/L (ref 96–106)
CO2 SERPL-SCNC: 26 MMOL/L (ref 20–29)
COLOR UR: YELLOW
CREAT SERPL-MCNC: 0.95 MG/DL (ref 0.76–1.27)
EOSINOPHIL # BLD AUTO: 0.6 X10E3/UL (ref 0–0.4)
EOSINOPHIL NFR BLD AUTO: 8 %
ERYTHROCYTE [DISTWIDTH] IN BLOOD BY AUTOMATED COUNT: 13 % (ref 11.6–15.4)
GLOBULIN SER CALC-MCNC: 2.4 G/DL (ref 1.5–4.5)
GLUCOSE SERPL-MCNC: 83 MG/DL (ref 65–99)
GLUCOSE UR QL: NEGATIVE
HCT VFR BLD AUTO: 42.7 % (ref 37.5–51)
HGB BLD-MCNC: 14.7 G/DL (ref 13–17.7)
HGB UR QL STRIP: NEGATIVE
IMM GRANULOCYTES # BLD AUTO: 0 X10E3/UL (ref 0–0.1)
IMM GRANULOCYTES NFR BLD AUTO: 0 %
KETONES UR QL STRIP: NEGATIVE
LEUKOCYTE ESTERASE UR QL STRIP: NEGATIVE
LYMPHOCYTES # BLD AUTO: 1.6 X10E3/UL (ref 0.7–3.1)
LYMPHOCYTES NFR BLD AUTO: 23 %
MCH RBC QN AUTO: 31.7 PG (ref 26.6–33)
MCHC RBC AUTO-ENTMCNC: 34.4 G/DL (ref 31.5–35.7)
MCV RBC AUTO: 92 FL (ref 79–97)
MICRO URNS: NORMAL
MONOCYTES # BLD AUTO: 0.5 X10E3/UL (ref 0.1–0.9)
MONOCYTES NFR BLD AUTO: 7 %
NEUTROPHILS # BLD AUTO: 4.4 X10E3/UL (ref 1.4–7)
NEUTROPHILS NFR BLD AUTO: 61 %
NITRITE UR QL STRIP: NEGATIVE
PH UR STRIP: 7 [PH] (ref 5–7.5)
PLATELET # BLD AUTO: 290 X10E3/UL (ref 150–450)
POTASSIUM SERPL-SCNC: 4.7 MMOL/L (ref 3.5–5.2)
PROT SERPL-MCNC: 6.9 G/DL (ref 6–8.5)
PROT UR QL STRIP: NEGATIVE
RBC # BLD AUTO: 4.63 X10E6/UL (ref 4.14–5.8)
SODIUM SERPL-SCNC: 142 MMOL/L (ref 134–144)
SP GR UR: 1.02 (ref 1–1.03)
UROBILINOGEN UR STRIP-MCNC: 0.2 MG/DL (ref 0.2–1)
WBC # BLD AUTO: 7.1 X10E3/UL (ref 3.4–10.8)

## 2020-01-10 ENCOUNTER — TELEPHONE (OUTPATIENT)
Dept: NEUROLOGY | Age: 35
End: 2020-01-10

## 2020-01-10 NOTE — TELEPHONE ENCOUNTER
----- Message from Kevyn Lama sent at 1/10/2020  2:07 PM EST -----  Regarding: Dr. Noe/ Telephone  Pt would like a call back regarding scheduling a NP appt.  Contact is [phone]

## 2020-02-11 NOTE — ANCILLARY DISCHARGE INSTRUCTIONS
[facility]  [facility] ([facility]B IV), [address] [facility]  [address] [place] [address] [address]  Phone: [phone] Fax: [phone]    Discharge Summary  2-15    Patient name: Carlos Lucas  : 1985  Provider#: [id number]  Referral source: Jany Evans MD      Medical/Treatment Diagnosis: Left knee pain [M25.562]     Prior Hospitalization: see medical history     Comorbidities: See Plan of Care  Prior Level of Function:See Plan of Care  Medications: Verified on Patient Summary List    Start of Care: 19      Onset Date:18   Visits from Start of Care: 1     Missed Visits: 1  Reporting Period : 19 to 19      ASSESSMENT/SUMMARY OF CARE: Pt is discharged today, 2019, as they have stopped attending therapy. Final objective data and outcomes were unable to be obtained.       RECOMMENDATIONS:  [x]Discontinue therapy: []Patient has reached or is progressing toward set goals      [x]Patient is non-compliant or has abdicated      []Due to lack of appreciable progress towards set goals      []Other    Marycruz Tsai, PT 2019
[address]134 Exp Problem Focused - Mod. Complex

## 2020-02-13 ENCOUNTER — OFFICE VISIT (OUTPATIENT)
Dept: NEUROLOGY | Age: 35
End: 2020-02-13

## 2020-02-13 DIAGNOSIS — F41.9 ANXIETY AND DEPRESSION: Primary | ICD-10-CM

## 2020-02-13 DIAGNOSIS — F32.A ANXIETY AND DEPRESSION: Primary | ICD-10-CM

## 2020-02-13 DIAGNOSIS — F90.9 ATTENTION DEFICIT HYPERACTIVITY DISORDER (ADHD), UNSPECIFIED ADHD TYPE: ICD-10-CM
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
## 2020-02-24 ENCOUNTER — OFFICE VISIT (OUTPATIENT)
Dept: PRIMARY CARE CLINIC | Age: 35
End: 2020-02-24

## 2020-02-24 VITALS
OXYGEN SATURATION: 97 % | TEMPERATURE: 97.5 F | SYSTOLIC BLOOD PRESSURE: 127 MMHG | DIASTOLIC BLOOD PRESSURE: 75 MMHG | BODY MASS INDEX: 26.41 KG/M2 | HEART RATE: 74 BPM | RESPIRATION RATE: 16 BRPM | WEIGHT: 195 LBS | HEIGHT: 72 IN

## 2020-02-24 DIAGNOSIS — J01.00 ACUTE NON-RECURRENT MAXILLARY SINUSITIS: Primary | ICD-10-CM

## 2020-02-24 RX ORDER — AMOXICILLIN AND CLAVULANATE POTASSIUM 875; 125 MG/1; MG/1
1 TABLET, FILM COATED ORAL EVERY 12 HOURS
Qty: 20 TAB | Refills: 0 | Status: SHIPPED | OUTPATIENT
Start: 2020-02-24 | End: 2020-03-05

## 2020-02-24 NOTE — PROGRESS NOTES
HISTORY OF PRESENT ILLNESS  Cash Guerra is a 37 y.o. male. Patient reports purulent sinus congestion, sinus pain radiating to upper teeth for about a week. He has tried sudafed with no relief. Patient has history of recurrent sinus infections. Visit Vitals  /75   Pulse 74   Temp 97.5 °F (36.4 °C) (Oral)   Resp 16   Ht 6' (1.829 m)   Wt 195 lb (88.5 kg)   SpO2 97%   BMI 26.45 kg/m²       HPI    Review of Systems   HENT: Positive for congestion and sinus pain. Physical Exam  Constitutional:       Appearance: Normal appearance. HENT:      Head: Normocephalic. Right Ear: Hearing, tympanic membrane, ear canal and external ear normal.      Left Ear: Hearing, tympanic membrane, ear canal and external ear normal.      Nose: Congestion and rhinorrhea present. Rhinorrhea is purulent. Right Turbinates: Swollen. Left Turbinates: Swollen. Right Sinus: Maxillary sinus tenderness present. Left Sinus: Maxillary sinus tenderness present. Mouth/Throat:      Lips: Pink. Mouth: Mucous membranes are moist.      Pharynx: Oropharynx is clear. Neck:      Musculoskeletal: Normal range of motion. Cardiovascular:      Rate and Rhythm: Normal rate and regular rhythm. Pulmonary:      Effort: Pulmonary effort is normal.      Breath sounds: Normal breath sounds. Lymphadenopathy:      Cervical: No cervical adenopathy. Skin:     General: Skin is warm and dry. Neurological:      General: No focal deficit present. Mental Status: He is alert and oriented to person, place, and time. Psychiatric:         Mood and Affect: Mood normal.         Behavior: Behavior normal.         ASSESSMENT and PLAN    ICD-10-CM ICD-9-CM    1.  Acute non-recurrent maxillary sinusitis J01.00 461.0      Orders Placed This Encounter    amoxicillin-clavulanate (AUGMENTIN) 875-125 mg per tablet   flonase  Nasal saline rinses

## 2020-02-24 NOTE — PROGRESS NOTES
Chief Complaint   Patient presents with    Pressure Behind the Eyes     Patient in room #6 with complaints of sinus pressure, pain, sneezing, drainage, coughing, teeth pain for a week

## 2020-06-26 ENCOUNTER — HOSPITAL ENCOUNTER (EMERGENCY)
Age: 35
Discharge: HOME | End: 2020-06-27
Attending: EMERGENCY MEDICINE
Payer: COMMERCIAL

## 2020-06-26 DIAGNOSIS — K20.90 ESOPHAGITIS: ICD-10-CM

## 2020-06-26 DIAGNOSIS — T18.128A FOOD IMPACTION OF ESOPHAGUS, INITIAL ENCOUNTER: Primary | ICD-10-CM

## 2020-06-26 PROCEDURE — 99284 EMERGENCY DEPT VISIT MOD MDM: CPT

## 2020-06-26 PROCEDURE — 96374 THER/PROPH/DIAG INJ IV PUSH: CPT

## 2020-06-26 PROCEDURE — 99283 EMERGENCY DEPT VISIT LOW MDM: CPT

## 2020-06-26 PROCEDURE — 74011250636 HC RX REV CODE- 250/636: Performed by: EMERGENCY MEDICINE

## 2020-06-26 RX ADMIN — GLUCAGON HYDROCHLORIDE 1 MG: 1 INJECTION, POWDER, FOR SOLUTION INTRAMUSCULAR; INTRAVENOUS; SUBCUTANEOUS at 23:28

## 2020-06-26 RX ADMIN — SODIUM CHLORIDE 1000 ML: 900 INJECTION, SOLUTION INTRAVENOUS at 23:29

## 2020-06-27 VITALS
HEART RATE: 82 BPM | TEMPERATURE: 98.6 F | WEIGHT: 192.68 LBS | BODY MASS INDEX: 26.98 KG/M2 | OXYGEN SATURATION: 98 % | RESPIRATION RATE: 15 BRPM | SYSTOLIC BLOOD PRESSURE: 127 MMHG | DIASTOLIC BLOOD PRESSURE: 72 MMHG | HEIGHT: 71 IN

## 2020-06-27 PROCEDURE — 76040000019: Performed by: SPECIALIST

## 2020-06-27 PROCEDURE — 76060000031 HC ANESTHESIA FIRST 0.5 HR: Performed by: SPECIALIST

## 2020-06-27 PROCEDURE — 74011250636 HC RX REV CODE- 250/636: Performed by: EMERGENCY MEDICINE

## 2020-06-27 PROCEDURE — 74011250636 HC RX REV CODE- 250/636

## 2020-06-27 PROCEDURE — 88312 SPECIAL STAINS GROUP 1: CPT

## 2020-06-27 PROCEDURE — 88305 TISSUE EXAM BY PATHOLOGIST: CPT

## 2020-06-27 PROCEDURE — 74011250636 HC RX REV CODE- 250/636: Performed by: SPECIALIST

## 2020-06-27 RX ORDER — FENTANYL CITRATE 50 UG/ML
100 INJECTION, SOLUTION INTRAMUSCULAR; INTRAVENOUS ONCE
Status: DISCONTINUED | OUTPATIENT
Start: 2020-06-27 | End: 2020-06-27

## 2020-06-27 RX ORDER — PANTOPRAZOLE SODIUM 40 MG/1
40 TABLET, DELAYED RELEASE ORAL 2 TIMES DAILY
Qty: 60 TAB | Refills: 3 | Status: SHIPPED | OUTPATIENT
Start: 2020-06-27 | End: 2020-10-25

## 2020-06-27 RX ORDER — SODIUM CHLORIDE 0.9 % (FLUSH) 0.9 %
5-40 SYRINGE (ML) INJECTION EVERY 8 HOURS
Status: DISCONTINUED | OUTPATIENT
Start: 2020-06-27 | End: 2020-06-27

## 2020-06-27 RX ORDER — MIDAZOLAM HYDROCHLORIDE 5 MG/ML
5 INJECTION, SOLUTION INTRAMUSCULAR; INTRAVENOUS ONCE
Status: DISCONTINUED | OUTPATIENT
Start: 2020-06-27 | End: 2020-06-27 | Stop reason: HOSPADM

## 2020-06-27 RX ORDER — SODIUM CHLORIDE 0.9 % (FLUSH) 0.9 %
5-40 SYRINGE (ML) INJECTION AS NEEDED
Status: DISCONTINUED | OUTPATIENT
Start: 2020-06-27 | End: 2020-06-27

## 2020-06-27 RX ORDER — SODIUM CHLORIDE 9 MG/ML
50 INJECTION, SOLUTION INTRAVENOUS CONTINUOUS
Status: DISCONTINUED | OUTPATIENT
Start: 2020-06-27 | End: 2020-06-27

## 2020-06-27 RX ORDER — PROPOFOL 10 MG/ML
200 INJECTION, EMULSION INTRAVENOUS
Status: COMPLETED | OUTPATIENT
Start: 2020-06-27 | End: 2020-06-27

## 2020-06-27 RX ORDER — DEXTROMETHORPHAN/PSEUDOEPHED 2.5-7.5/.8
1.2 DROPS ORAL
Status: DISCONTINUED | OUTPATIENT
Start: 2020-06-27 | End: 2020-06-27

## 2020-06-27 RX ORDER — PROPOFOL 10 MG/ML
INJECTION, EMULSION INTRAVENOUS
Status: COMPLETED
Start: 2020-06-27 | End: 2020-06-27

## 2020-06-27 RX ADMIN — PROPOFOL 200 MG: 10 INJECTION, EMULSION INTRAVENOUS at 01:18

## 2020-06-27 RX ADMIN — SODIUM CHLORIDE 50 ML/HR: 900 INJECTION, SOLUTION INTRAVENOUS at 00:50

## 2020-06-27 RX ADMIN — PROPOFOL 200 MG: 10 INJECTION, EMULSION INTRAVENOUS at 01:19

## 2020-06-27 NOTE — PROCEDURES
Esophagogastroduodenoscopy Procedure Note      Rebecca Patel  1985  [id number]     Indication:  Esophageal obstruction secondary to food impaction     Endoscopist: Abbey Moreira MD    Referring Provider:  None    Sedation:  MAC anesthesia Propofol administered by Dr. Morrow Shutters: ED    Procedure Details:  After infomed consent was obtained for the procedure, with all risks and benefits of procedure explained the patient was taken to the endoscopy suite and placed in the left lateral decubitus position. Following sequential administration of sedation as per above, the endoscope was inserted into the mouth and advanced under direct vision to second portion of the duodenum. A careful inspection was made as the gastroscope was withdrawn, including a retroflexed view of the proximal stomach; findings and interventions are described below. Findings:     Esophagus:   + Food bolus seen in upper esophagus that was a dislodged piece of food and then further distally was a larger impacted food bolus. We gently slid scope along the side of the larger food bolus and could see a diffuse ringed esophagus c/w EoE. The bolus suddenly moved distally. There was a ring at the EG junction located at 36 cm from the incisors. There was a 3-4 cm hiatal hernia below the ring. We performed a biopsy of the esophagus to r/o EoE but the appearance was c/w this. Stomach:   - Normal mucosa with patent pylorus. Duodenum:   - The bulb and post bulbar mucosa is normal in appearance to the second portion. The duodenal folds appeared normal.      Therapies:    biopsy of esophagus  Foreign body removal    Specimen: Specimens were collected as described and send to the laboratory. Complications:   None were encountered during the procedure. EBL: < 10 ml.           Recommendations:   -f/u path   -omeprazole 40 mg BID  -f/u with Dr. Gaby Solis for EGD with dilation in 2 weeks    Abbey Moreira MD  6/27/2020 1:05 AM

## 2020-06-27 NOTE — CONSULTS
Gastroenterology  History and Physical    Patient: Valeriano Silva    Physician: Garret Morse MD    Vital Signs: Blood pressure 114/81, pulse 65, temperature 98.6 °F (37 °C), resp. rate 20, height 5' 11\" (1.803 m), weight 87.4 kg (192 lb 10.9 oz), SpO2 100 %. Allergies: Allergies   Allergen Reactions    Codeine Hives    Codeine Other (comments)     bradycardia    Shellfish Derived Nausea and Vomiting       Chief Complaint: Food lodged in esophagus    History of Present Illness: 38 yo WM who presented to the ER with acute onset of food lodging in the esophagus at 2 pm: colette. These episodes have been occurring off and on for the past 2 years. Denies taking any medications for heartburn. No smoking or significant EtOH. He thinks he threw up several pieces of food around 5 pm but since then has been unable to swallow liquid. No prior EGD. Justification for Procedure: above    History:  Past Medical History:   Diagnosis Date    Preventative health care     No past surgical history on file. Social History     Socioeconomic History    Marital status:      Spouse name: Not on file    Number of children: Not on file    Years of education: Not on file    Highest education level: Not on file   Tobacco Use    Smoking status: Never Smoker    Smokeless tobacco: Never Used   Substance and Sexual Activity    Alcohol use: Yes     Comment: occasional     Drug use: No    Sexual activity: Yes     Partners: Female     Birth control/protection: Condom   Social History Narrative    Habits:  Lifetime non cigarette abuser, non drug abuser. Occasional alcohol use.         Social History:  Patient is  for the past 5 1/2 years, they have a 5year old daughter and 1year old son. He is gainfully employed as a realtor. Completed one year of college.   [place]ple preference is none.         Family History:  Father  68, multiple problems, COPD, cigarette abuse, MI, diabetes, amputation and CHF. Mother 68 with hypertension. Six brothers, all alive and well. Family History   Problem Relation Age of Onset    Diabetes Father        Medications:   Prior to Admission medications    Not on File     Physical Exam:   General: alert, no distress   HEENT: Head: Normocephalic, no lesions, without obvious abnormality.    Heart: regular rate and rhythm, S1, S2 normal, no murmur, click, rub or gallop   Lungs: chest clear, no wheezing, rales, normal symmetric air entry   Abdominal: soft, nontender, nondistended, + BS   Neurological: Grossly normal   Extremities: extremities normal, atraumatic, no cyanosis or edema     Findings/Diagnosis: Food Impaction    Plan of Care/Planned Procedure: EGD with food extraction

## 2020-06-27 NOTE — ED PROVIDER NOTES
EMERGENCY DEPARTMENT HISTORY AND PHYSICAL EXAM      Date: 6/26/2020  Patient Name: Valeriano Silva    History of Presenting Illness     Chief Complaint   Patient presents with    Dysphagia     reports eating a cheeseburger and food became stuck at 1400 today and not able to swallow; maintains secretions       History Provided By: Patient    HPI: Valeriano Silva, 37 y.o. male  With no prior medical history presenting today with esophageal food bolus. Patient notes that he ate 1 bite of cheeseburger at 2:00 today and has been unable to swallow since then. He notes that he has had multiple episodes of vomiting but has not been able to dislodge the food bolus. He has been able to eat or drink anything since that time, and now is having to spit out his secretions. No other complaints at this time. He notes that he feels the bolus is stuck in the mid chest.    There are no other complaints, changes, or physical findings at this time. PCP: None    No current facility-administered medications on file prior to encounter. No current outpatient medications on file prior to encounter. Past History     Past Medical History:  Past Medical History:   Diagnosis Date    Preventative health care    Denies prior medical history    Past Surgical History:  History reviewed. No pertinent surgical history. Denies prior surgical history  Family History:  Family History   Problem Relation Age of Onset    Diabetes Father        Social History:  Social History     Tobacco Use    Smoking status: Never Smoker    Smokeless tobacco: Never Used   Substance Use Topics    Alcohol use: Yes     Comment: occasional     Drug use: No       Allergies:   Allergies   Allergen Reactions    Codeine Hives    Codeine Other (comments)     bradycardia    Shellfish Derived Nausea and Vomiting         Review of Systems   Constitutional: No  fever  Skin: No  rash  HEENT: No  nasal congestion  Resp: No cough  CV: + chest pain  GI: No vomiting  : No dysuria  MSK: No joint pain  Neuro: No numbness  Psych: No suicidal      Physical Exam     Patient Vitals for the past 12 hrs:   Temp Pulse Resp BP SpO2   06/27/20 0148  82 15 127/72 98 %   06/27/20 0146  72 13 125/75 98 %   06/27/20 0144  80 13 115/72 98 %   06/27/20 0142  73 16 115/73 98 %   06/27/20 0140  71 14 125/74 98 %   06/27/20 0138  79 10 (!) 128/94 98 %   06/27/20 0136  83 15 124/81 97 %   06/27/20 0134  83 13 121/78 97 %   06/27/20 0132  77 17 (!) 115/98 98 %   06/27/20 0130  84 17 114/79 100 %   06/27/20 0128  82 14 120/76 98 %   06/27/20 0126  84 19 125/84 99 %   06/27/20 0124  87 15 122/69 97 %   06/27/20 0122  87 19 115/72 98 %   06/27/20 0120  85 16 116/69 97 %   06/27/20 0118  83 12 118/78 98 %   06/27/20 0116  89 15 124/70 97 %   06/27/20 0114  91 14 113/74 97 %   06/27/20 0112  95 13 103/66 98 %   06/27/20 0110  (!) 103 16 (!) 109/94 98 %   06/27/20 0108  (!) 114 16 124/88 97 %   06/27/20 0100  74 26 124/88 100 %   06/27/20 0054  74 (!) 35 (!) 152/137 100 %   06/26/20 2230 98.6 °F (37 °C) 65 20 114/81 100 %   06/26/20 1857 98.2 °F (36.8 °C) 74 18 120/78 100 %     General: alert, No acute distress  Eyes: EOMI, normal conjunctiva  ENT: moist mucous membranes. Neck: Active, full ROM of neck. Skin: No rashes. no jaundice              Lungs: Equal chest expansion. no respiratory distress. clear to auscultation bilaterally No accessory muscle usage  Heart: regular rate     no peripheral edema   2+ radial pulses and DPs bilaterally  Abd:  non distended soft, nontender. No rebound tenderness. No guarding  Back: Full ROM  MSK: Full, active ROM in all 4 extremities. Neuro: Person, Place, Time and Situation; normal speech;   Psych: Cooperative with exam; Appropriate mood and affect             Diagnostic Study Results     Labs -   No results found for this or any previous visit (from the past 12 hour(s)).     Radiologic Studies -   No orders to display     CT Results (Last 48 hours)    None        CXR Results  (Last 48 hours)    None          Medical Decision Making   I am the first provider for this patient. I reviewed the vital signs, available nursing notes, past medical history, past surgical history, family history and social history. Provider Notes (Medical Decision Making):     Differential Diagnosis: Esophageal food impaction, esophagitis, GERD    Initial Plan: Attempted with glucagon, but the patient did not have any relief of his food impaction symptoms. Talked with gastroenterology who will come for endoscopy and procedural sedation. ED Course:   Initial assessment performed. The patients presenting problems have been discussed, and they are in agreement with the care plan formulated and outlined with them. I have encouraged them to ask questions as they arise throughout their visit. ED Course as of Jun 27 0207   Fri Jun 26, 2020   2083 CONSULT NOTE:   11:35 PM  I spoke with Dr. Paras Giraldo  Specialty: GI  Discussed pt's hx, disposition, and available diagnostic and imaging results. Reviewed care plans. Plan: Will prepare for endoscopy. [NW]   Sat Jun 27, 2020   0139 Successful upper endoscopy, the patient does have evidence of esophagitis, will place him on a PPI twice daily. Sedation without complication. [NW]      ED Course User Index  [NW] Kristopher Stoll MD       I, Eder Garcia MD, am the attending of record for this patient encounter.     Procedure:  PROCEDURE:  Procedural Sedation   Performed by: Michael Clifton MD  Indications: Endoscopy for food impaction    Universal Protocol: a time out was performed and the correct patient and site were verified   Consent: The risks and benefits of monitored anesthesia care, including the risk of aspiration, deep sedation requiring airway management including possible intubation, nausea/vomiting and the risks of not performing the procedure, including severe pain and inability to complete the procedure, were all discussed with the patient. The alternatives of performing the procedure, including local anesthesia and IV analgesia, also discussed. The patient has a ride home available. ASA Class:I-healthy   Pre-anesthesia evaluation, including history, exam, and informed consent is documented in the ED note above. Monitoring: Continuous monitoring of heart rate, respiratory rate, pulse oximetry and ETCO2. Supplemental oxygen prior to and during procedure via nasal cannula. Resuscitation equipment available at the bedside during sedation. The patient received propofol and dosages were recorded on the sedation form total was 280 mg. The patient was recovered from the sedation without complication or incident. Patient returned to pre-sedation level of awareness. The monitoring was discontinued at this time. Sedation start time: 0057  Sedation end time: 0115    Post-anesthesia evaluation:   Respiratory function, cardiovascular function, temperature, and mental status did return to pre-anesthetic state. Pain was controlled. The patient did tolerate p.o. Dispo: Discharged. The patient has been re-evaluated and is ready for discharge. Reviewed available results with patient. Counseled patient on diagnosis and care plan. Patient has expressed understanding, and all questions have been answered. Patient agrees with plan and agrees to follow up as recommended, or to return to the ED if their symptoms worsen. Discharge instructions have been provided and explained to the patient, along with reasons to return to the ED. PLAN:  Discharge Medication List as of 6/27/2020  1:20 AM      START taking these medications    Details   pantoprazole (PROTONIX) 40 mg tablet Take 1 Tab by mouth two (2) times a day for 120 days. , Normal, Disp-60 Tab, R-3         1.   2.     Follow-up Information     Follow up With Specialties Details Why Contact Info    Gage Monzon MD Gastroenterology Schedule an appointment as soon as possible for a visit   [address] [place] [address]  [phone]          3. Return to ED if worse       Diagnosis     Clinical Impression:   1. Food impaction of esophagus, initial encounter    2. Esophagitis        Attestations:    Torres Coats MD    Please note that this dictation was completed with [employer], the computer voice recognition software. Quite often unanticipated grammatical, syntax, homophones, and other interpretive errors are inadvertently transcribed by the computer software. Please disregard these errors. Please excuse any errors that have escaped final proofreading. Thank you.

## 2020-06-27 NOTE — ED NOTES
Assumed care of pt, pt alert and oriented x4; pt c/o food being stuck in throat since 2pm; stated this happened before and he was able to pass it down; chest pain related to chief complaint; denies SOB; denies fever and chills; placed on monitor x2; call bell within reach

## 2020-07-29 PROBLEM — K20.0 EOSINOPHILIC ESOPHAGITIS: Status: ACTIVE | Noted: 2020-07-28

## 2020-07-29 PROBLEM — K22.2 SCHATZKI'S RING: Status: ACTIVE | Noted: 2020-07-28

## 2020-08-27 ENCOUNTER — HOSPITAL ENCOUNTER (OUTPATIENT)
Dept: PREADMISSION TESTING | Age: 35
Discharge: HOME | End: 2020-08-27
Payer: COMMERCIAL

## 2020-08-27 DIAGNOSIS — Z01.812 PRE-PROCEDURE LAB EXAM: ICD-10-CM

## 2020-08-27 PROCEDURE — 87635 SARS-COV-2 COVID-19 AMP PRB: CPT

## 2020-08-28 LAB — SARS-COV-2, COV2NT: NOT DETECTED

## 2020-08-31 ENCOUNTER — ANESTHESIA EVENT (OUTPATIENT)
Dept: ENDOSCOPY | Age: 35
End: 2020-08-31
Payer: COMMERCIAL

## 2020-08-31 ENCOUNTER — ANESTHESIA (OUTPATIENT)
Dept: ENDOSCOPY | Age: 35
End: 2020-08-31
Payer: COMMERCIAL

## 2020-08-31 ENCOUNTER — HOSPITAL ENCOUNTER (OUTPATIENT)
Age: 35
Setting detail: OUTPATIENT SURGERY
Discharge: HOME | End: 2020-08-31
Attending: INTERNAL MEDICINE | Admitting: INTERNAL MEDICINE
Payer: COMMERCIAL

## 2020-08-31 VITALS
RESPIRATION RATE: 16 BRPM | BODY MASS INDEX: 25.53 KG/M2 | TEMPERATURE: 97.6 F | DIASTOLIC BLOOD PRESSURE: 30 MMHG | HEART RATE: 68 BPM | HEIGHT: 72 IN | WEIGHT: 188.5 LBS | OXYGEN SATURATION: 99 % | SYSTOLIC BLOOD PRESSURE: 118 MMHG

## 2020-08-31 PROCEDURE — 88305 TISSUE EXAM BY PATHOLOGIST: CPT

## 2020-08-31 PROCEDURE — 76060000031 HC ANESTHESIA FIRST 0.5 HR: Performed by: INTERNAL MEDICINE

## 2020-08-31 PROCEDURE — 77030021593 HC FCPS BIOP ENDOSC BSC -A: Performed by: INTERNAL MEDICINE

## 2020-08-31 PROCEDURE — 76040000019: Performed by: INTERNAL MEDICINE

## 2020-08-31 PROCEDURE — 74011000250 HC RX REV CODE- 250: Performed by: NURSE ANESTHETIST, CERTIFIED REGISTERED

## 2020-08-31 PROCEDURE — 74011250636 HC RX REV CODE- 250/636: Performed by: NURSE ANESTHETIST, CERTIFIED REGISTERED

## 2020-08-31 RX ORDER — SODIUM CHLORIDE 9 MG/ML
50 INJECTION, SOLUTION INTRAVENOUS CONTINUOUS
Status: DISCONTINUED | OUTPATIENT
Start: 2020-08-31 | End: 2020-08-31 | Stop reason: HOSPADM

## 2020-08-31 RX ORDER — SODIUM CHLORIDE 0.9 % (FLUSH) 0.9 %
5-40 SYRINGE (ML) INJECTION EVERY 8 HOURS
Status: DISCONTINUED | OUTPATIENT
Start: 2020-08-31 | End: 2020-08-31 | Stop reason: HOSPADM

## 2020-08-31 RX ORDER — SODIUM CHLORIDE 0.9 % (FLUSH) 0.9 %
5-40 SYRINGE (ML) INJECTION AS NEEDED
Status: DISCONTINUED | OUTPATIENT
Start: 2020-08-31 | End: 2020-08-31 | Stop reason: HOSPADM

## 2020-08-31 RX ORDER — EPINEPHRINE 0.1 MG/ML
1 INJECTION INTRACARDIAC; INTRAVENOUS
Status: DISCONTINUED | OUTPATIENT
Start: 2020-08-31 | End: 2020-08-31 | Stop reason: HOSPADM

## 2020-08-31 RX ORDER — FLUMAZENIL 0.1 MG/ML
0.2 INJECTION INTRAVENOUS
Status: DISCONTINUED | OUTPATIENT
Start: 2020-08-31 | End: 2020-08-31 | Stop reason: HOSPADM

## 2020-08-31 RX ORDER — LIDOCAINE HYDROCHLORIDE 20 MG/ML
INJECTION, SOLUTION EPIDURAL; INFILTRATION; INTRACAUDAL; PERINEURAL AS NEEDED
Status: DISCONTINUED | OUTPATIENT
Start: 2020-08-31 | End: 2020-08-31 | Stop reason: HOSPADM

## 2020-08-31 RX ORDER — DEXTROMETHORPHAN/PSEUDOEPHED 2.5-7.5/.8
1.2 DROPS ORAL
Status: DISCONTINUED | OUTPATIENT
Start: 2020-08-31 | End: 2020-08-31 | Stop reason: HOSPADM

## 2020-08-31 RX ORDER — PROPOFOL 10 MG/ML
INJECTION, EMULSION INTRAVENOUS AS NEEDED
Status: DISCONTINUED | OUTPATIENT
Start: 2020-08-31 | End: 2020-08-31 | Stop reason: HOSPADM

## 2020-08-31 RX ORDER — SODIUM CHLORIDE 9 MG/ML
INJECTION, SOLUTION INTRAVENOUS
Status: DISCONTINUED | OUTPATIENT
Start: 2020-08-31 | End: 2020-08-31 | Stop reason: HOSPADM

## 2020-08-31 RX ORDER — NALOXONE HYDROCHLORIDE 0.4 MG/ML
0.4 INJECTION, SOLUTION INTRAMUSCULAR; INTRAVENOUS; SUBCUTANEOUS
Status: DISCONTINUED | OUTPATIENT
Start: 2020-08-31 | End: 2020-08-31 | Stop reason: HOSPADM

## 2020-08-31 RX ORDER — ATROPINE SULFATE 0.1 MG/ML
0.5 INJECTION INTRAVENOUS
Status: DISCONTINUED | OUTPATIENT
Start: 2020-08-31 | End: 2020-08-31 | Stop reason: HOSPADM

## 2020-08-31 RX ADMIN — PROPOFOL 100 MG: 10 INJECTION, EMULSION INTRAVENOUS at 15:28

## 2020-08-31 RX ADMIN — LIDOCAINE HYDROCHLORIDE 100 MG: 20 INJECTION, SOLUTION EPIDURAL; INFILTRATION; INTRACAUDAL; PERINEURAL at 15:27

## 2020-08-31 RX ADMIN — PROPOFOL 100 MG: 10 INJECTION, EMULSION INTRAVENOUS at 15:27

## 2020-08-31 RX ADMIN — PROPOFOL 50 MG: 10 INJECTION, EMULSION INTRAVENOUS at 15:30

## 2020-08-31 RX ADMIN — SODIUM CHLORIDE: 900 INJECTION, SOLUTION INTRAVENOUS at 15:20

## 2020-08-31 NOTE — H&P
118 [facility].  217 [facility] 140 [facility], [address]  [phone]                                History and Physical     NAME: Teodoro Vinson   :  1985   MRN:  [id number]     HPI:  The patient was seen and examined. History reviewed. No pertinent surgical history. Past Medical History:   Diagnosis Date    Eosinophilic esophagitis     Irene Frost md    [facility] care     [facility] 2020    Irene Frost md     Social History     Tobacco Use    Smoking status: Never Smoker    Smokeless tobacco: Never Used   Substance Use Topics    Alcohol use: Yes     Comment: occasional     Drug use: No     Allergies   Allergen Reactions    Codeine Hives    Codeine Other (comments)     bradycardia    Shellfish Derived Nausea and Vomiting     Family History   Problem Relation Age of Onset    Diabetes Father      Current Facility-Administered Medications   Medication Dose Route Frequency    0.9% sodium chloride infusion  50 mL/hr IntraVENous CONTINUOUS    sodium chloride (NS) flush 5-40 mL  5-40 mL IntraVENous Q8H    sodium chloride (NS) flush 5-40 mL  5-40 mL IntraVENous PRN    naloxone (NARCAN) injection 0.4 mg  0.4 mg IntraVENous Multiple    flumazeniL (ROMAZICON) 0.1 mg/mL injection 0.2 mg  0.2 mg IntraVENous Multiple    simethicone (MYLICON) 53DP/2.5HT oral drops 80 mg  1.2 mL Oral Multiple    atropine injection 0.5 mg  0.5 mg IntraVENous ONCE PRN    EPINEPHrine (ADRENALIN) 0.1 mg/mL syringe 1 mg  1 mg Endoscopically ONCE PRN     Facility-Administered Medications Ordered in Other Encounters   Medication Dose Route Frequency    0.9% sodium chloride infusion   IntraVENous CONTINUOUS         PHYSICAL EXAM:  General: WD, WN. Alert, cooperative, no acute distress    HEENT: NC, Atraumatic. PERRLA, EOMI. Anicteric sclerae. Lungs:  CTA Bilaterally. No Wheezing/Rhonchi/Rales.   Heart:  Regular  rhythm,  No murmur, No Rubs, No Gallops  Abdomen: Soft, Non distended, Non tender. +Bowel sounds, no HSM  Extremities: No c/c/e  Neurologic:  CN 2-12 gi, Alert and oriented X 3. No acute neurological distress   Psych:   Good insight. Not anxious nor agitated. The heart, lungs and mental status were satisfactory for the administration of MAC sedation and for the procedure. Mallampati score: 2     The patient was counseled at length about the risks of yesenia Covid-19 in the héctor-operative and post-operative states including the recovery window of their procedure. The patient was made aware that yesenia Covid-19 after a surgical procedure may worsen their prognosis for recovering from the virus and lend to a higher morbidity and or mortality risk. The patient was given the options of postponing their procedure. All of the risks, benefits, and alternatives were discussed. The patient does  wish to proceed with the procedure.       Assessment:   · Dysphagia    Plan:   · Endoscopic procedure  · MAC sedation   ·

## 2020-08-31 NOTE — ANESTHESIA PREPROCEDURE EVALUATION
Relevant Problems   No relevant active problems       Anesthetic History   No history of anesthetic complications            Review of Systems / Medical History  Patient summary reviewed, nursing notes reviewed and pertinent labs reviewed    Pulmonary  Within defined limits                 Neuro/Psych   Within defined limits           Cardiovascular  Within defined limits                Exercise tolerance: >4 METS     GI/Hepatic/Renal     GERD           Endo/Other  Within defined limits           Other Findings              Physical Exam    Airway  Mallampati: II  TM Distance: > 6 cm  Neck ROM: normal range of motion   Mouth opening: Normal     Cardiovascular  Regular rate and rhythm,  S1 and S2 normal,  no murmur, click, rub, or gallop             Dental  No notable dental hx       Pulmonary  Breath sounds clear to auscultation               Abdominal  GI exam deferred       Other Findings            Anesthetic Plan    ASA: 1  Anesthesia type: MAC            Anesthetic plan and risks discussed with: Patient

## 2020-08-31 NOTE — DISCHARGE INSTRUCTIONS
118 [facility].  [address]  [phone]                     DISCHARGE INSTRUCTIONS    Pasquale Eye  [id number]  1985    DISCOMFORT:  Sore throat- throat lozenges or warm salt water gargle  redness at IV site- apply warm compress to area; if redness or soreness persist- contact your physician  Gaseous discomfort- walking, belching will help relieve any discomfort    DIET  You may eat and drink after you leave. You may resume your regular diet - however -  remember your colon is empty and a heavy meal will produce gas. Avoid these foods:  vegetables, fried / greasy foods, carbonated drinks   You may not drink alcoholic beverages for at least 12 hours    ACTIVITY  You may resume your normal daily activities   Spend the remainder of the day resting -  avoid any strenuous activity. You may not operate a vehicle for 12 hours  You may not engage in an occupation involving machinery or appliances for rest of today  Avoid making any critical decisions for at least 24 hour    CALL M.D. ANY SIGN OF   Increasing pain, nausea, vomiting  Abdominal distension (swelling)  New increased bleeding (oral or rectal)  Fever (chills)  Pain in chest area  Bloody discharge from nose or mouth  Shortness of breath    Follow-up Instructions:   Call Dr. Susi Singh for any questions or problems. If we took a biopsy please call the office within 2 weeks to discuss your pathology results. Telephone # [phone]       ENDOSCOPY FINDINGS:   1. Eosiniphilic Esophagitis         Learning About Coronavirus (COVID-19)  Coronavirus (COVID-19): Overview  What is coronavirus ([id number])? The coronavirus disease (COVID-19) is caused by a virus. It is an illness that was first found in [place], [place], in December 2019. It has since spread worldwide. The virus can cause fever, cough, and trouble breathing. In severe cases, it can cause pneumonia and make it hard to breathe without help.  It can cause death. Coronaviruses are a large group of viruses. They cause the common cold. They also cause more serious illnesses like Middle East respiratory syndrome (MERS) and severe acute respiratory syndrome (SARS). COVID-19 is caused by a novel coronavirus. That means it's a new type that has not been seen in people before. This virus spreads person-to-person through droplets from coughing and sneezing. It can also spread when you are close to someone who is infected. And it can spread when you touch something that has the virus on it, such as a doorknob or a tabletop. What can you do to protect yourself from coronavirus (COVID-19)? The best way to protect yourself from getting sick is to:  · Avoid areas where there is an outbreak. · Avoid contact with people who may be infected. · Wash your hands often with soap or alcohol-based hand sanitizers. · Avoid crowds and try to stay at least 6 feet away from other people. · Wash your hands often, especially after you cough or sneeze. Use soap and water, and scrub for at least 20 seconds. If soap and water aren't available, use an alcohol-based hand . · Avoid touching your mouth, nose, and eyes. What can you do to avoid spreading the virus to others? To help avoid spreading the virus to others:  · Cover your mouth with a tissue when you cough or sneeze. Then throw the tissue in the trash. · Use a disinfectant to clean things that you touch often. · Stay home if you are sick or have been exposed to the virus. Don't go to school, work, or public areas. And don't use public transportation. · If you are sick:  ? Leave your home only if you need to get medical care. But call the doctor's office first so they know you're coming. And wear a face mask, if you have one.  ? If you have a face mask, wear it whenever you're around other people. It can help stop the spread of the virus when you cough or sneeze. ? Clean and disinfect your home every day.  Use household  and disinfectant wipes or sprays. Take special care to clean things that you grab with your hands. These include doorknobs, remote controls, phones, and handles on your refrigerator and microwave. And don't forget countertops, tabletops, bathrooms, and computer keyboards. When to call for help  Call 911 anytime you think you may need emergency care. For example, call if:  · You have severe trouble breathing. (You can't talk at all.)  · You have constant chest pain or pressure. · You are severely dizzy or lightheaded. · You are confused or can't think clearly. · Your face and lips have a blue color. · You pass out (lose consciousness) or are very hard to wake up. Call your doctor now if you develop symptoms such as:  · Shortness of breath. · Fever. · Cough. If you need to get care, call ahead to the doctor's office for instructions before you go. Make sure you wear a face mask, if you have one, to prevent exposing other people to the virus. Where can you get the latest information? The following health organizations are tracking and studying this virus. Their websites contain the most up-to-date information. Tim Suazo also learn what to do if you think you may have been exposed to the virus. · U.S. Centers for Disease Control and Prevention (CDC): The CDC provides updated news about the disease and travel advice. The website also tells you how to prevent the spread of infection. www.cdc.gov  · World Health Organization [facility]): WHO offers information about the virus outbreaks. WHO also has travel advice. www.who.int  Current as of: April 1, 2020               Content Version: 12.4  © 4535-7211 [employer], Incorporated.    Care instructions adapted under license by your healthcare professional. If you have questions about a medical condition or this instruction, always ask your healthcare professional. [address] any warranty or liability for your use of this information.

## 2020-08-31 NOTE — ANESTHESIA POSTPROCEDURE EVALUATION
Post-Anesthesia Evaluation and Assessment    Patient: Ash Kirkland MRN: [id number]  SSN: [id number]    YOB: 1985  Age: 37 y.o. Sex: male       Cardiovascular Function/Vital Signs  Visit Vitals  BP (!) 118/30   Pulse 68   Temp 36.4 °C (97.6 °F)   Resp 16   Ht 5' 11.5\" (1.816 m)   Wt 85.5 kg (188 lb 8 oz)   SpO2 99%   BMI 25.92 kg/m²       Patient is status post MAC anesthesia for Procedure(s):  UPPER ENDOSCOPY     :-  ESOPHAGOGASTRODUODENAL (EGD) BIOPSY. Nausea/Vomiting: None    Postoperative hydration reviewed and adequate. Pain:  Pain Scale 1: Numeric (0 - 10) (08/31/20 1549)  Pain Intensity 1: 0 (08/31/20 1549)   Managed    Neurological Status: At baseline    Mental Status and Level of Consciousness: Alert and oriented to person, place, and time    Pulmonary Status:   O2 Device: Room air (08/31/20 1549)   Adequate oxygenation and airway patent    Complications related to anesthesia: None    Post-anesthesia assessment completed. No concerns    Signed By: Montana Zepeda MD     August 31, 2020              Procedure(s):  UPPER ENDOSCOPY     :-  ESOPHAGOGASTRODUODENAL (EGD) BIOPSY. MAC    <[email or web address]>    INITIAL Post-op Vital signs:   Vitals Value Taken Time   /59 8/31/2020  4:03 PM   Temp 36.4 °C (97.6 °F) 8/31/2020  3:49 PM   Pulse 72 8/31/2020  4:03 PM   Resp 13 8/31/2020  4:03 PM   SpO2 100 % 8/31/2020  4:03 PM   Vitals shown include unvalidated device data.

## 2020-08-31 NOTE — PROCEDURES
118 [facility].  217 [facility] 140 [address], [address]   [phone]                            NAME:  Pasquale Willoughby   :   1985   MRN:   [id number]     Date/Time:  2020 3:36 PM    Esophagogastroduodenoscopy (EGD) Procedure Note    :  Maverick Salinas MD    Staff: Endoscopy RN-1: Mitzi Cruz RN  Endoscopy RN-2: Mandeep Zhang RN     Implants: none    Referring Provider:  Ramy Morales MD    Anethesia/Sedation:  MAC anesthesia    Procedure Details     After infom consent was obtained for the procedure, with all risks and benefits of procedure explained the patient was taken to the endoscopy suite and placed in the left lateral decubitus position. Following sequential administration of sedation as per above, the [id number] gastroscope was inserted into the mouth and advanced under direct vision to second portion of the duodenum. A careful inspection was made as the gastroscope was withdrawn, including a retroflexed view of the proximal stomach; findings and interventions are described below. Findings:  Esophagus: Multiple concentric rings were noted in entire esophagus. Presbyesophagus was noted. Linear furrows were noted in entire esophagus. No esophagitis or obstructing mass was noted. Stomach:normal   Duodenum/jejunum:normal      Therapies:  biopsy of esophagus at lower third and middle third    Specimens: biopsy of esophagus at lower third and middle third           EBL: None    Complications:   None; patient tolerated the procedure well. Impression:    See Postoperative diagnosis above    Recommendations:  -Await pathology. , -Follow up in office in 2-4 weeks    Discharge disposition:  Home in the company of  when able to ambulate    Maverick Salinas MD

## 2020-08-31 NOTE — PROGRESS NOTES

## 2022-03-18 PROBLEM — K20.0 EOSINOPHILIC ESOPHAGITIS: Status: ACTIVE | Noted: 2020-07-28

## 2022-03-20 PROBLEM — K22.2 SCHATZKI'S RING: Status: ACTIVE | Noted: 2020-07-28

## 2022-03-22 ENCOUNTER — HOSPITAL ENCOUNTER (EMERGENCY)
Age: 37
Discharge: HOME | End: 2022-03-22
Attending: STUDENT IN AN ORGANIZED HEALTH CARE EDUCATION/TRAINING PROGRAM
Payer: COMMERCIAL

## 2022-03-22 ENCOUNTER — ANESTHESIA EVENT (OUTPATIENT)
Dept: SURGERY | Age: 37
End: 2022-03-22
Payer: COMMERCIAL

## 2022-03-22 ENCOUNTER — ANESTHESIA (OUTPATIENT)
Dept: SURGERY | Age: 37
End: 2022-03-22
Payer: COMMERCIAL

## 2022-03-22 VITALS
BODY MASS INDEX: 27.38 KG/M2 | DIASTOLIC BLOOD PRESSURE: 78 MMHG | TEMPERATURE: 98.5 F | SYSTOLIC BLOOD PRESSURE: 115 MMHG | HEIGHT: 71 IN | OXYGEN SATURATION: 99 % | RESPIRATION RATE: 13 BRPM | HEART RATE: 73 BPM | WEIGHT: 195.55 LBS

## 2022-03-22 DIAGNOSIS — T18.108A FOREIGN BODY IN ESOPHAGUS, INITIAL ENCOUNTER: Primary | ICD-10-CM

## 2022-03-22 PROBLEM — R13.10 DYSPHAGIA: Status: ACTIVE | Noted: 2022-03-22

## 2022-03-22 LAB
COVID-19 RAPID TEST, COVR: NOT DETECTED
SOURCE, COVRS: NORMAL

## 2022-03-22 PROCEDURE — 77030008684 HC TU ET CUF COVD -B: Performed by: ANESTHESIOLOGY

## 2022-03-22 PROCEDURE — 88305 TISSUE EXAM BY PATHOLOGIST: CPT

## 2022-03-22 PROCEDURE — 76060000031 HC ANESTHESIA FIRST 0.5 HR: Performed by: SPECIALIST

## 2022-03-22 PROCEDURE — 96374 THER/PROPH/DIAG INJ IV PUSH: CPT

## 2022-03-22 PROCEDURE — 74011000250 HC RX REV CODE- 250: Performed by: NURSE ANESTHETIST, CERTIFIED REGISTERED

## 2022-03-22 PROCEDURE — 87635 SARS-COV-2 COVID-19 AMP PRB: CPT

## 2022-03-22 PROCEDURE — 77030019988 HC FCPS ENDOSC DISP BSC -B: Performed by: SPECIALIST

## 2022-03-22 PROCEDURE — 76210000006 HC OR PH I REC 0.5 TO 1 HR: Performed by: SPECIALIST

## 2022-03-22 PROCEDURE — 74011000250 HC RX REV CODE- 250: Performed by: EMERGENCY MEDICINE

## 2022-03-22 PROCEDURE — 77030010104 HC SEAL PRT ENDOSC BYRN -B: Performed by: SPECIALIST

## 2022-03-22 PROCEDURE — 77030026438 HC STYL ET INTUB CARD -A: Performed by: ANESTHESIOLOGY

## 2022-03-22 PROCEDURE — 99285 EMERGENCY DEPT VISIT HI MDM: CPT

## 2022-03-22 PROCEDURE — 74011250636 HC RX REV CODE- 250/636: Performed by: NURSE ANESTHETIST, CERTIFIED REGISTERED

## 2022-03-22 PROCEDURE — 2709999900 HC NON-CHARGEABLE SUPPLY: Performed by: SPECIALIST

## 2022-03-22 PROCEDURE — 76010000154 HC OR TIME FIRST 0.5 HR: Performed by: SPECIALIST

## 2022-03-22 PROCEDURE — 74011250636 HC RX REV CODE- 250/636: Performed by: EMERGENCY MEDICINE

## 2022-03-22 RX ORDER — PANTOPRAZOLE SODIUM 40 MG/1
40 TABLET, DELAYED RELEASE ORAL DAILY
Qty: 90 TABLET | Refills: 3 | Status: SHIPPED | OUTPATIENT
Start: 2022-03-22

## 2022-03-22 RX ORDER — PROPOFOL 10 MG/ML
INJECTION, EMULSION INTRAVENOUS AS NEEDED
Status: DISCONTINUED | OUTPATIENT
Start: 2022-03-22 | End: 2022-03-22 | Stop reason: HOSPADM

## 2022-03-22 RX ORDER — SODIUM CHLORIDE 0.9 % (FLUSH) 0.9 %
5-40 SYRINGE (ML) INJECTION EVERY 8 HOURS
Status: CANCELLED | OUTPATIENT
Start: 2022-03-22

## 2022-03-22 RX ORDER — SODIUM CHLORIDE, SODIUM LACTATE, POTASSIUM CHLORIDE, CALCIUM CHLORIDE 600; 310; 30; 20 MG/100ML; MG/100ML; MG/100ML; MG/100ML
25 INJECTION, SOLUTION INTRAVENOUS CONTINUOUS
Status: DISCONTINUED | OUTPATIENT
Start: 2022-03-22 | End: 2022-03-22 | Stop reason: HOSPADM

## 2022-03-22 RX ORDER — ONDANSETRON 2 MG/ML
4 INJECTION INTRAMUSCULAR; INTRAVENOUS AS NEEDED
Status: DISCONTINUED | OUTPATIENT
Start: 2022-03-22 | End: 2022-03-22 | Stop reason: HOSPADM

## 2022-03-22 RX ORDER — FENTANYL CITRATE 50 UG/ML
25 INJECTION, SOLUTION INTRAMUSCULAR; INTRAVENOUS
Status: DISCONTINUED | OUTPATIENT
Start: 2022-03-22 | End: 2022-03-22 | Stop reason: HOSPADM

## 2022-03-22 RX ORDER — ONDANSETRON 2 MG/ML
INJECTION INTRAMUSCULAR; INTRAVENOUS AS NEEDED
Status: DISCONTINUED | OUTPATIENT
Start: 2022-03-22 | End: 2022-03-22 | Stop reason: HOSPADM

## 2022-03-22 RX ORDER — MIDAZOLAM HYDROCHLORIDE 1 MG/ML
INJECTION, SOLUTION INTRAMUSCULAR; INTRAVENOUS AS NEEDED
Status: DISCONTINUED | OUTPATIENT
Start: 2022-03-22 | End: 2022-03-22 | Stop reason: HOSPADM

## 2022-03-22 RX ORDER — SODIUM CHLORIDE, SODIUM LACTATE, POTASSIUM CHLORIDE, CALCIUM CHLORIDE 600; 310; 30; 20 MG/100ML; MG/100ML; MG/100ML; MG/100ML
INJECTION, SOLUTION INTRAVENOUS
Status: DISCONTINUED | OUTPATIENT
Start: 2022-03-22 | End: 2022-03-22 | Stop reason: HOSPADM

## 2022-03-22 RX ORDER — SODIUM CHLORIDE 0.9 % (FLUSH) 0.9 %
5-40 SYRINGE (ML) INJECTION AS NEEDED
Status: CANCELLED | OUTPATIENT
Start: 2022-03-22

## 2022-03-22 RX ORDER — DEXTROMETHORPHAN/PSEUDOEPHED 2.5-7.5/.8
1.2 DROPS ORAL
Status: CANCELLED | OUTPATIENT
Start: 2022-03-22

## 2022-03-22 RX ORDER — HYDROMORPHONE HYDROCHLORIDE 1 MG/ML
.2-.5 INJECTION, SOLUTION INTRAMUSCULAR; INTRAVENOUS; SUBCUTANEOUS
Status: DISCONTINUED | OUTPATIENT
Start: 2022-03-22 | End: 2022-03-22 | Stop reason: HOSPADM

## 2022-03-22 RX ORDER — PANTOPRAZOLE SODIUM 40 MG/1
40 TABLET, DELAYED RELEASE ORAL DAILY
Status: ON HOLD | COMMUNITY
End: 2022-03-22 | Stop reason: SDUPTHER

## 2022-03-22 RX ORDER — SUCCINYLCHOLINE CHLORIDE 20 MG/ML
INJECTION INTRAMUSCULAR; INTRAVENOUS AS NEEDED
Status: DISCONTINUED | OUTPATIENT
Start: 2022-03-22 | End: 2022-03-22 | Stop reason: HOSPADM

## 2022-03-22 RX ORDER — DEXAMETHASONE SODIUM PHOSPHATE 4 MG/ML
INJECTION, SOLUTION INTRA-ARTICULAR; INTRALESIONAL; INTRAMUSCULAR; INTRAVENOUS; SOFT TISSUE AS NEEDED
Status: DISCONTINUED | OUTPATIENT
Start: 2022-03-22 | End: 2022-03-22 | Stop reason: HOSPADM

## 2022-03-22 RX ORDER — MIDAZOLAM HYDROCHLORIDE 1 MG/ML
1 INJECTION, SOLUTION INTRAMUSCULAR; INTRAVENOUS AS NEEDED
Status: CANCELLED | OUTPATIENT
Start: 2022-03-22

## 2022-03-22 RX ORDER — FENTANYL CITRATE 50 UG/ML
50 INJECTION, SOLUTION INTRAMUSCULAR; INTRAVENOUS AS NEEDED
Status: CANCELLED | OUTPATIENT
Start: 2022-03-22

## 2022-03-22 RX ORDER — LIDOCAINE HYDROCHLORIDE 10 MG/ML
0.1 INJECTION, SOLUTION EPIDURAL; INFILTRATION; INTRACAUDAL; PERINEURAL AS NEEDED
Status: CANCELLED | OUTPATIENT
Start: 2022-03-22

## 2022-03-22 RX ORDER — SODIUM CHLORIDE, SODIUM LACTATE, POTASSIUM CHLORIDE, CALCIUM CHLORIDE 600; 310; 30; 20 MG/100ML; MG/100ML; MG/100ML; MG/100ML
25 INJECTION, SOLUTION INTRAVENOUS CONTINUOUS
Status: CANCELLED | OUTPATIENT
Start: 2022-03-22 | End: 2022-03-23

## 2022-03-22 RX ORDER — SODIUM CHLORIDE 9 MG/ML
50 INJECTION, SOLUTION INTRAVENOUS CONTINUOUS
Status: CANCELLED | OUTPATIENT
Start: 2022-03-22 | End: 2022-03-22

## 2022-03-22 RX ORDER — LIDOCAINE HYDROCHLORIDE 20 MG/ML
INJECTION, SOLUTION EPIDURAL; INFILTRATION; INTRACAUDAL; PERINEURAL AS NEEDED
Status: DISCONTINUED | OUTPATIENT
Start: 2022-03-22 | End: 2022-03-22 | Stop reason: HOSPADM

## 2022-03-22 RX ADMIN — PROPOFOL 200 MG: 10 INJECTION, EMULSION INTRAVENOUS at 16:10

## 2022-03-22 RX ADMIN — SODIUM CHLORIDE, POTASSIUM CHLORIDE, SODIUM LACTATE AND CALCIUM CHLORIDE: 600; 310; 30; 20 INJECTION, SOLUTION INTRAVENOUS at 15:57

## 2022-03-22 RX ADMIN — GLUCAGON HYDROCHLORIDE 1 MG: 1 INJECTION, POWDER, FOR SOLUTION INTRAMUSCULAR; INTRAVENOUS; SUBCUTANEOUS at 14:27

## 2022-03-22 RX ADMIN — ONDANSETRON HYDROCHLORIDE 4 MG: 2 INJECTION, SOLUTION INTRAMUSCULAR; INTRAVENOUS at 16:15

## 2022-03-22 RX ADMIN — ANTACID/ANTIFLATULENT 4 G: 380; 550; 10; 10 GRANULE, EFFERVESCENT ORAL at 14:27

## 2022-03-22 RX ADMIN — SUCCINYLCHOLINE CHLORIDE 200 MG: 20 INJECTION, SOLUTION INTRAMUSCULAR; INTRAVENOUS at 16:10

## 2022-03-22 RX ADMIN — DEXAMETHASONE SODIUM PHOSPHATE 8 MG: 4 INJECTION, SOLUTION INTRAMUSCULAR; INTRAVENOUS at 16:15

## 2022-03-22 RX ADMIN — LIDOCAINE HYDROCHLORIDE 100 MG: 20 INJECTION, SOLUTION INTRAVENOUS at 16:10

## 2022-03-22 RX ADMIN — MIDAZOLAM HYDROCHLORIDE 2 MG: 1 INJECTION, SOLUTION INTRAMUSCULAR; INTRAVENOUS at 16:03

## 2022-03-22 NOTE — PERIOP NOTES
196 [facility] from Operating Room to PACU    Report received from 3 [facility] and R Lobb CRNA regarding [facility]. Surgeon(s):  Angella Berger MD  And Procedure(s) (LRB):  ESOPHAGOGASTRODUODENOSCOPY (EGD), WITH FOREIGN BODY REMOVAL, AND BIOPSY (N/A)  confirmed   with allergies discussed. Anesthesia type, drugs, patient history, complications, estimated blood loss, vital signs, intake and output, and last pain medication, lines and temperature were reviewed. 1650 Discharge instructions provided to pt's wife, [facility] [facility]. Opportunity for questions/clarifications provided. PIV removed. Pt d/c'd via WC in NAD with all belongings. 5 Spoke with Dr. Vanessa Craig as pt's wife is inquiring about pt taking budesonide that was prescribed for pt previously. MD stated that was ok & for pt to call his office for a refill if needed.

## 2022-03-22 NOTE — ANESTHESIA POSTPROCEDURE EVALUATION
Procedure(s):  ESOPHAGOGASTRODUODENOSCOPY (EGD), WITH FOREIGN BODY REMOVAL, AND BIOPSY. general    Anesthesia Post Evaluation      Multimodal analgesia: multimodal analgesia used between 6 hours prior to anesthesia start to PACU discharge  Patient location during evaluation: PACU  Level of consciousness: sleepy but conscious  Pain management: adequate  Airway patency: patent  Anesthetic complications: no  Cardiovascular status: acceptable  Respiratory status: acceptable  Hydration status: acceptable  Comments: +Post-Anesthesia Evaluation and Assessment    Patient: Steffany Birmingham MRN: [id number]  SSN: [id number]   YOB: 1985  Age: 30 y.o. Sex: male      Cardiovascular Function/Vital Signs    /70   Pulse 73   Temp 36.9 °C (98.5 °F)   Resp 13   Ht 5' 11\" (1.803 m)   Wt 88.7 kg (195 lb 8.8 oz)   SpO2 99%   BMI 27.27 kg/m²     Patient is status post Procedure(s):  ESOPHAGOGASTRODUODENOSCOPY (EGD), WITH FOREIGN BODY REMOVAL, AND BIOPSY. Nausea/Vomiting: Controlled. Postoperative hydration reviewed and adequate. Pain:  Pain Scale 1: Visual (03/22/22 1645)  Pain Intensity 1: 0 (03/22/22 1645)   Managed. Neurological Status:   Neuro (WDL): Within Defined Limits (03/22/22 1631)   At baseline. Mental Status and Level of Consciousness: Arousable. Pulmonary Status:   O2 Device: None (Room air) (03/22/22 1645)   Adequate oxygenation and airway patent. Complications related to anesthesia: None    Post-anesthesia assessment completed. No concerns. Signed By: Vivien Gar MD    3/22/2022  Post anesthesia nausea and vomiting:  controlled  Final Post Anesthesia Temperature Assessment:  Normothermia (36.0-37.5 degrees C)      INITIAL Post-op Vital signs:   Vitals Value Taken Time   /78 03/22/22 1645   Temp 36.9 °C (98.5 °F) 03/22/22 1631   Pulse 70 03/22/22 1655   Resp 13 03/22/22 1655   SpO2 98 % 03/22/22 1655   Vitals shown include unvalidated device data.

## 2022-03-22 NOTE — CONSULTS
Gastroenterology Consultation Note  JEFFRY Merritt   for Dr. Daren Cantu    NAME: Jacklyn Coy : 1985 MRN: [id number]   ATTG: Dr. Calixto Alcala, ELZBIETA PCP: Stacey Castelan MD  Date/Time:  3/22/2022 2:50 PM  Subjective:   REASON FOR CONSULT:      Jacklyn Coy is a 30 y.o.  male who I was asked to see for FB in esophagus. Hx of EoE. He reports he was eating breaded chicken around 8 PM and felt it get stuck last night. Drank some liquids but could not get it down. He has some pain, worked this AM.  Not getting liquids or solids down. Now not able to tolerate secretions. Had an issue this weekned with eating a burger Saturday but he was able to get it down eventually after multiple maneuvers. He previously was on Pantoprazole but ran out and replaced with Omeprazole 20mg once daily. Has never seen an allergist, has seasonal allergies. Tried glucagon in the ER without improvement. EGD on 2020: Findings:  Esophagus: Multiple concentric rings were noted in entire esophagus. Presbyesophagus was noted. Linear furrows were noted in entire esophagus. No esophagitis or obstructing mass was noted. Stomach:normal   Duodenum/jejunum:normal    1. Esophagus, lower, biopsy:        Mild reflux esophagitis   Rare intraepithelial eosinophils (less than 5 per high-power field)     2. Esophagus, mid, biopsy:        Eosinophilic esophagitis   Intraepithelial eosinophils are variable in number ranging from 0-56 per   high-power field     EGD 20 Dr. Gildardo Burns:   Findings:      Esophagus:   +          Food bolus seen in upper esophagus that was a dislodged piece of food and then further distally was a larger impacted food bolus. We gently slid scope along the side of the larger food bolus and could see a diffuse ringed esophagus c/w EoE. The bolus suddenly moved distally. There was a ring at the EG junction located at 36 cm from the incisors.   There was a 3-4 cm hiatal hernia below the ring. We performed a biopsy of the esophagus to r/o EoE but the appearance was c/w this.     Stomach:   -           Normal mucosa with patent pylorus.     Duodenum:   -           The bulb and post bulbar mucosa is normal in appearance to the second portion. The duodenal folds appeared normal.      No family hx of EoE. Vapes, rare EtOH. Some seasonal allergies. Past Medical History:   Diagnosis Date    Eosinophilic esophagitis 11/33/4831    Irene Frost md    [facility] care     [facility] 07/28/2020    Irene Frost md      No past surgical history on file. Social History     Tobacco Use    Smoking status: Never Smoker    Smokeless tobacco: Never Used   Substance Use Topics    Alcohol use: Yes     Comment: occasional       Family History   Problem Relation Age of Onset    Diabetes Father       Allergies   Allergen Reactions    Codeine Hives    Codeine Other (comments)     bradycardia    Shellfish Derived Nausea and Vomiting      Home Medications:  None     Hospital medications:  No current facility-administered medications for this encounter. No current outpatient medications on file.      REVIEW OF SYSTEMS:     []     Unable to obtain  ROS due to  []    mental status change  []    sedated   []    intubated  Review of Systems -   History obtained from the patient  General ROS: negative for - chills or fever  Psychological ROS: negative for - anxiety or depression  Hematological and Lymphatic ROS: negative for - bleeding problems  Respiratory ROS: no cough, shortness of breath, or wheezing  Cardiovascular ROS: no chest pain or dyspnea on exertion  Gastrointestinal ROS: See HPI  Genito-Urinary ROS: no dysuria, trouble voiding, or hematuria  Musculoskeletal ROS: negative for - gait disturbance  Neurological ROS: no TIA or stroke symptoms  Dermatological ROS: negative for - rash    Objective:   VITALS:    Visit Vitals  /83 (BP 1 Location: Left upper arm, BP Patient Position: At rest)   Pulse 93   Temp 98.5 °F (36.9 °C)   Resp 18   Ht 5' 11\" (1.803 m)   Wt 88.7 kg (195 lb 8.8 oz)   SpO2 97%   BMI 27.27 kg/m²     Temp (24hrs), Av.5 °F (36.9 °C), Min:98.5 °F (36.9 °C), Max:98.5 °F (36.9 °C)    PHYSICAL EXAM:   General:    Alert, cooperative WM, no distress, appears stated age. Head:   Normocephalic, without obvious abnormality, atraumatic. Eyes:   Conjunctivae clear, anicteric sclerae. Pupils are equal  Nose:  Nares normal. No drainage or sinus tenderness. Throat:    Lips, mucosa, and tongue normal.  No Thrush  Neck:  Supple, symmetrical,  no adenopathy, thyroid: non tender  Lungs:   CTA bilaterally. No wheezing/rhonchi/rales. Heart:   Regular rate and rhythm,  no murmur, rub or gallop. Abdomen:   Soft, non-tender. Not distended. Bowel sounds normal. No masses. No  hepatosplenomegaly. Rectal:  Deferred  Extremities: No cyanosis. No edema. No clubbing  Skin:     Texture, turgor normal. No rashes/lesions/jaundice  Lymph: Cervical, supraclavicular normal.  Psych:  Poor insight. Not depressed. Not anxious or agitated. Neurologic: EOMs intact. No facial asymmetry. No aphasia or slurred speech normal strength, A/O X 3. LAB DATA REVIEWED:    Lab Results   Component Value Date/Time    WBC 7.1 2020 01:30 PM    HGB 14.7 2020 01:30 PM    HCT 42.7 2020 01:30 PM    PLATELET 085  01:30 PM    MCV 92 2020 01:30 PM     Lab Results   Component Value Date/Time    ALT (SGPT) 43 2020 01:30 PM    Alk.  phosphatase 89 2020 01:30 PM    Bilirubin, total 0.6 2020 01:30 PM     Lab Results   Component Value Date/Time    Sodium 142 2020 01:30 PM    Potassium 4.7 2020 01:30 PM    Chloride 102 2020 01:30 PM    CO2 26 2020 01:30 PM    Glucose 83 2020 01:30 PM    BUN 10 2020 01:30 PM    Creatinine 0.95 2020 01:30 PM    BUN/Creatinine ratio 11 2020 01:30 PM    GFR est  01/06/2020 01:30 PM    GFR est non- 01/06/2020 01:30 PM    Calcium 9.5 01/06/2020 01:30 PM     No results found for: LPSE  No results found for: INR, PTMR, PTP, PT1, PT2, INREXT    IMAGING RESULTS:   []      I have personally reviewed the actual   []    CXR  []    CT  []     US    Impression:  Active Problems:    Eosinophilic esophagitis (3/99/1034)      Overview: Irene Frost md      Dysphagia (3/22/2022)      Plan:  Patient with feeling of chicken stuck in his esophagus from last night. Unable to tolerate liquids, solids or secretions. No improvement with glucagon. He has hx of food impaction in the esophagus in the past due to EoE. Recommend proceed with EGD in OR today for FB removal.  Advised it will likely be an incomplete EGD as our goal is to push food into stomach. He needs to resume Pantoprazole 40mg once daily and would benefit from allergist referral as an outpatient.   Stressed the importance of f/u and periodic EGDs to monitor eosinophil counts.        ___________________________________________________  Care Plan discussed with:    [x]    Patient   [x]    Family   []    Nursing   []    Attending  Total Time :  30  minutes   ___________________________________________________  GI: JEFFRY Woods

## 2022-03-22 NOTE — ADDENDUM NOTE
Addendum  created 03/22/22 1654 by Haresh York MD    [address] recorded in [address] [place] [address], [facility] 97 filed

## 2022-03-22 NOTE — ANESTHESIA PREPROCEDURE EVALUATION
Relevant Problems   No relevant active problems       Anesthetic History   No history of anesthetic complications            Review of Systems / Medical History  Patient summary reviewed, nursing notes reviewed and pertinent labs reviewed    Pulmonary  Within defined limits                 Neuro/Psych   Within defined limits           Cardiovascular  Within defined limits                Exercise tolerance: >4 METS     GI/Hepatic/Renal  Within defined limits   GERD           Endo/Other  Within defined limits           Other Findings   Comments: Food bolus  Eosinophilic esophagitis  Schatzki's ring           Physical Exam    Airway  Mallampati: II  TM Distance: 4 - 6 cm  Neck ROM: normal range of motion   Mouth opening: Normal     Cardiovascular  Regular rate and rhythm,  S1 and S2 normal,  no murmur, click, rub, or gallop  Rhythm: regular  Rate: normal         Dental  No notable dental hx       Pulmonary  Breath sounds clear to auscultation               Abdominal  GI exam deferred       Other Findings            Anesthetic Plan    ASA: 2  Anesthesia type: general    Monitoring Plan: BIS      Induction: Intravenous  Anesthetic plan and risks discussed with: Patient

## 2022-03-22 NOTE — PROCEDURES
Esophagogastroduodenoscopy Procedure Note      Gonzalez Paz  1985  [id number]    Indication:  Esophageal obstruction     Endoscopist: Viktoriya Espinosa MD    Referring Provider:  Leobardo Saucedo MD    Sedation:  General anesthesia    Procedure Details:  After infomed consent was obtained for the procedure, with all risks and benefits of procedure explained the patient was taken to the endoscopy suite and placed in the left lateral decubitus position. Following sequential administration of sedation as per above, the endoscope was inserted into the mouth and advanced under direct vision to second portion of the duodenum. A careful inspection was made as the gastroscope was withdrawn, including a retroflexed view of the proximal stomach; findings and interventions are described below. Findings:     Esophagus:   ++ There was a large food bolus c/w ingested chicken in the mid espohagus at approximately 25 cm from incisors with obvious diffuse ridging appearance to the esophagus c/w EoE. We attempted to gently apply pressure to the bolus and then as we slowly applied pressure the bolus moved down the esophagus. There was some superficial tearing of the esophagus at the site of the bolus. There was a ring that almost didn't allow the scope to advance in the mid esophagus with appearance of EoE. No other mucosal abnromalities found. Bx of mid esophagus performed to evaluate for h/o EoE. Stomach:   -Normal    Duodenum:   -Normal to 2nd portion    Therapies: see above    Specimen: Specimens were collected as described and send to the laboratory. Complications:   None were encountered during the procedure. EBL: < 10 ml.           Recommendations:   -Patient to remain on high dose PPI  -F/U Post procedure to consider dilation    Viktoriya Espinosa MD  3/22/2022  4:30 PM

## 2022-03-22 NOTE — DISCHARGE INSTRUCTIONS
Brijesh Luna  [id number]  1985    EGD DISCHARGE INSTRUCTIONS  Discomfort:  Sore throat- throat lozenges or warm salt water gargle  redness at IV site- apply warm compress to area; if redness or soreness persist- contact your physician  Gaseous discomfort- walking, belching will help relieve any discomfort  You may not operate a vehicle for 12 hours  You may not engage in an occupation involving machinery or appliances for rest of today. You may not drink alcoholic beverages for at least 12 hours  Avoid making any critical decisions for at least 24 hour  DIET  You may resume your regular diet - however -  remember your colon is empty and a heavy meal will produce gas. Avoid these foods:  vegetables, fried / greasy foods, carbonated drinks  MEDICATIONS        Regarding Aspirin or Nonsteroidal medications specifically, please see below. ACTIVITY  You may resume your normal daily activities. Spend the remainder of the day resting -  avoid any strenuous activity. CALL M.D. ANY SIGN OF   Increasing pain, nausea, vomiting  Abdominal distension (swelling)  New increased bleeding (oral or rectal)  Fever (chills)  Pain in chest area  Bloody discharge from nose or mouth  Shortness of breath    You may not not take any Advil, Aspirin, Ibuprofen, Motrin, Aleve, or Goodys for 10 days, ONLY  Tylenol as needed for pain. Follow-up Instructions:   Call Dr. Agapito Bliss for results of procedure / biopsy in 4-5 days at telephone #  [phone]              Take Pantoprazole 40 mg daily         DISCHARGE SUMMARY from Nurse    PATIENT INSTRUCTIONS:    After general anesthesia or intravenous sedation, for 24 hours or while taking prescription Narcotics:  · Limit your activities  · Do not drive and operate hazardous machinery  · Do not make important personal or business decisions  · Do  not drink alcoholic beverages  · If you have not urinated within 8 hours after discharge, please contact your surgeon on call.     Report the following to your surgeon:  · Excessive pain, swelling, redness or odor of or around the surgical area  · Temperature over 100.5  · Nausea and vomiting lasting longer than 4 hours or if unable to take medications  · Any signs of decreased circulation or nerve impairment to extremity: change in color, persistent  numbness, tingling, coldness or increase pain  · Any questions      These are general instructions for a healthy lifestyle:    No smoking/ No tobacco products/ Avoid exposure to second hand smoke  Surgeon General's Warning:  Quitting smoking now greatly reduces serious risk to your health. Obesity, smoking, and sedentary lifestyle greatly increases your risk for illness    A healthy diet, regular physical exercise & weight monitoring are important for maintaining a healthy lifestyle    You may be retaining fluid if you have a history of heart failure or if you experience any of the following symptoms:  Weight gain of 3 pounds or more overnight or 5 pounds in a week, increased swelling in our hands or feet or shortness of breath while lying flat in bed. Please call your doctor as soon as you notice any of these symptoms; do not wait until your next office visit.

## 2022-03-22 NOTE — ED PROVIDER NOTES
EMERGENCY DEPARTMENT HISTORY AND PHYSICAL EXAM      Date: 3/22/2022  Patient Name: Liv Jamison    History of Presenting Illness     Chief Complaint   Patient presents with   [facility] Foreign Body Swallowed     Patient arrives ambulatory to triage with CC of chicken stuck in his esophagus. This occured last night. Patient reports he is unable to swallow. Reports this has happened in the past with steak, patient reports that he recieved an endoscopy to pass it. HPI: Liv Jamison, 30 y.o. male presents to the ED with cc of chicken stuck in his throat. He was eating chicken last night, and since then feels like it has been stuck in his throat, has not been able to tolerate p.o. since then around 11 PM.  He states that this has happened in the past several times, has required endoscopic intervention. Reports history of EOE, was previously on pantoprazole, however no longer takes this. Denies any difficulty breathing. There are no other complaints, changes, or physical findings at this time. PCP: Graciela Charles MD    No current facility-administered medications on file prior to encounter. No current outpatient medications on file prior to encounter. Past History     Past Medical History:  Past Medical History:   Diagnosis Date    Eosinophilic esophagitis 24/39/8591    Irene Frost md    [facility] care     [facility] 07/28/2020    Irene Frost md       Past Surgical History:  Endoscopies    Family History:  Family History   Problem Relation Age of Onset    Diabetes Father        Social History:  Social History     Tobacco Use    Smoking status: Never Smoker    Smokeless tobacco: Never Used   Substance Use Topics    Alcohol use: Yes     Comment: occasional     Drug use: No       Allergies:   Allergies   Allergen Reactions    Codeine Hives    Codeine Other (comments)     bradycardia    Shellfish Derived Nausea and Vomiting         Review of Systems   no fever  No ear pain  No eye pain  no shortness of breath  no chest pain  no abdominal pain  no dysuria  no leg pain  No rash  No lymphadenopathy  No weight loss    Physical Exam   Physical Exam  Constitutional:       General: He is not in acute distress. Appearance: He is not toxic-appearing. Comments: Intermittently spitting his saliva into a water bottle   HENT:      Head: Normocephalic and atraumatic. Mouth/Throat:      Mouth: Mucous membranes are moist.      Comments: No stridor, breathing comfortably, spitting saliva into water bottle  Eyes:      Extraocular Movements: Extraocular movements intact. Cardiovascular:      Rate and Rhythm: Normal rate and regular rhythm. Pulmonary:      Effort: Pulmonary effort is normal.      Breath sounds: Normal breath sounds. Abdominal:      Palpations: Abdomen is soft. Tenderness: There is no abdominal tenderness. Musculoskeletal:         General: No deformity. Cervical back: Neck supple. Skin:     General: Skin is warm and dry. Neurological:      General: No focal deficit present. Mental Status: He is alert and oriented to person, place, and time. Psychiatric:         Mood and Affect: Mood normal.         Diagnostic Study Results     Labs -   No results found for this or any previous visit (from the past 24 hour(s)). Radiologic Studies -   No orders to display     CT Results  (Last 48 hours)    None        CXR Results  (Last 48 hours)    None            Medical Decision Making   I am the first provider for this patient. I reviewed the vital signs, available nursing notes, past medical history, past surgical history, family history and social history. Vital Signs-Reviewed the patient's vital signs. Patient Vitals for the past 24 hrs:   Temp Pulse Resp BP SpO2   03/22/22 1313 98.5 °F (36.9 °C) 93 18 124/83 97 %         Provider Notes (Medical Decision Making):   44-year-old presenting with sensation of esophageal foreign body. Has history of eosinophilic esophagitis. Per chart review, required endoscopic intervention at [facility] for foreign body. He is not tolerating his secretions right now. No signs of any airway compromise. Vitals otherwise unremarkable, he is in no acute distress. ED Course:     Initial assessment performed. The patients presenting problems have been discussed, and they are in agreement with the care plan formulated and outlined with them. I have encouraged them to ask questions as they arise throughout their visit. Attempted glucagon, easy gas, and jumping on heels, all without any resolution of foreign body, continues to be unable to tolerate p.o. Spoke with gastroenterology, who will plan for endoscopy. Critical Care Time:         Disposition:  Home    PLAN:  1. There are no discharge medications for this patient.     2.   Follow-up Information    None       Return to ED if worse     Diagnosis     Clinical Impression: Acute esophageal foreign body

## 2022-03-24 PROBLEM — R13.10 DYSPHAGIA: Status: ACTIVE | Noted: 2022-03-22

## 2023-12-05 ENCOUNTER — HOSPITAL ENCOUNTER (EMERGENCY)
Facility: HOSPITAL | Age: 38
Discharge: HOME | End: 2023-12-06
Attending: EMERGENCY MEDICINE
Payer: COMMERCIAL

## 2023-12-05 DIAGNOSIS — T18.128A ESOPHAGEAL OBSTRUCTION DUE TO FOOD IMPACTION: Primary | ICD-10-CM

## 2023-12-05 DIAGNOSIS — K22.2 ESOPHAGEAL OBSTRUCTION DUE TO FOOD IMPACTION: Primary | ICD-10-CM

## 2023-12-05 PROCEDURE — 96374 THER/PROPH/DIAG INJ IV PUSH: CPT

## 2023-12-05 PROCEDURE — 96375 TX/PRO/DX INJ NEW DRUG ADDON: CPT

## 2023-12-05 PROCEDURE — 99284 EMERGENCY DEPT VISIT MOD MDM: CPT

## 2023-12-05 PROCEDURE — 6360000002 HC RX W HCPCS: Performed by: EMERGENCY MEDICINE

## 2023-12-05 RX ORDER — DIAZEPAM 5 MG/ML
2 INJECTION, SOLUTION INTRAMUSCULAR; INTRAVENOUS ONCE
Status: COMPLETED | OUTPATIENT
Start: 2023-12-05 | End: 2023-12-05

## 2023-12-05 RX ADMIN — GLUCAGON 1 MG: KIT at 23:37

## 2023-12-05 RX ADMIN — DIAZEPAM 2 MG: 5 INJECTION, SOLUTION INTRAMUSCULAR; INTRAVENOUS at 23:33

## 2023-12-05 ASSESSMENT — PAIN DESCRIPTION - LOCATION: LOCATION: THROAT

## 2023-12-05 ASSESSMENT — PAIN - FUNCTIONAL ASSESSMENT: PAIN_FUNCTIONAL_ASSESSMENT: 0-10

## 2023-12-06 ENCOUNTER — ANESTHESIA (OUTPATIENT)
Facility: HOSPITAL | Age: 38
End: 2023-12-06
Payer: COMMERCIAL

## 2023-12-06 ENCOUNTER — ANESTHESIA EVENT (OUTPATIENT)
Facility: HOSPITAL | Age: 38
End: 2023-12-06
Payer: COMMERCIAL

## 2023-12-06 VITALS
RESPIRATION RATE: 15 BRPM | HEIGHT: 71 IN | TEMPERATURE: 98.1 F | BODY MASS INDEX: 30.52 KG/M2 | HEART RATE: 84 BPM | OXYGEN SATURATION: 96 % | DIASTOLIC BLOOD PRESSURE: 83 MMHG | SYSTOLIC BLOOD PRESSURE: 125 MMHG | WEIGHT: 218.03 LBS

## 2023-12-06 LAB
ALBUMIN SERPL-MCNC: 4.1 G/DL (ref 3.5–5)
ALBUMIN/GLOB SERPL: 1.3 (ref 1.1–2.2)
ALP SERPL-CCNC: 79 U/L (ref 45–117)
ALT SERPL-CCNC: 29 U/L (ref 12–78)
ANION GAP SERPL CALC-SCNC: 7 MMOL/L (ref 5–15)
AST SERPL-CCNC: 34 U/L (ref 15–37)
BASOPHILS # BLD: 0.1 K/UL (ref 0–0.1)
BASOPHILS NFR BLD: 1 % (ref 0–1)
BILIRUB SERPL-MCNC: 0.6 MG/DL (ref 0.2–1)
BUN SERPL-MCNC: 14 MG/DL (ref 6–20)
BUN/CREAT SERPL: 14 (ref 12–20)
CALCIUM SERPL-MCNC: 9.2 MG/DL (ref 8.5–10.1)
CHLORIDE SERPL-SCNC: 107 MMOL/L (ref 97–108)
CO2 SERPL-SCNC: 26 MMOL/L (ref 21–32)
CREAT SERPL-MCNC: 1.02 MG/DL (ref 0.7–1.3)
DIFFERENTIAL METHOD BLD: ABNORMAL
EOSINOPHIL # BLD: 0.9 K/UL (ref 0–0.4)
EOSINOPHIL NFR BLD: 10 % (ref 0–7)
ERYTHROCYTE [DISTWIDTH] IN BLOOD BY AUTOMATED COUNT: 13.1 % (ref 11.5–14.5)
GLOBULIN SER CALC-MCNC: 3.1 G/DL (ref 2–4)
GLUCOSE SERPL-MCNC: 89 MG/DL (ref 65–100)
HCT VFR BLD AUTO: 39.6 % (ref 36.6–50.3)
HGB BLD-MCNC: 14.3 G/DL (ref 12.1–17)
IMM GRANULOCYTES # BLD AUTO: 0 K/UL (ref 0–0.04)
IMM GRANULOCYTES NFR BLD AUTO: 0 % (ref 0–0.5)
LYMPHOCYTES # BLD: 1.8 K/UL (ref 0.8–3.5)
LYMPHOCYTES NFR BLD: 20 % (ref 12–49)
MCH RBC QN AUTO: 33.3 PG (ref 26–34)
MCHC RBC AUTO-ENTMCNC: 36.1 G/DL (ref 30–36.5)
MCV RBC AUTO: 92.3 FL (ref 80–99)
MONOCYTES # BLD: 0.6 K/UL (ref 0–1)
MONOCYTES NFR BLD: 7 % (ref 5–13)
NEUTS SEG # BLD: 5.5 K/UL (ref 1.8–8)
NEUTS SEG NFR BLD: 62 % (ref 32–75)
NRBC # BLD: 0 K/UL (ref 0–0.01)
NRBC BLD-RTO: 0 PER 100 WBC
PLATELET # BLD AUTO: 257 K/UL (ref 150–400)
PMV BLD AUTO: 10.2 FL (ref 8.9–12.9)
POTASSIUM SERPL-SCNC: 3.4 MMOL/L (ref 3.5–5.1)
PROT SERPL-MCNC: 7.2 G/DL (ref 6.4–8.2)
RBC # BLD AUTO: 4.29 M/UL (ref 4.1–5.7)
SODIUM SERPL-SCNC: 140 MMOL/L (ref 136–145)
WBC # BLD AUTO: 8.9 K/UL (ref 4.1–11.1)

## 2023-12-06 PROCEDURE — 36415 COLL VENOUS BLD VENIPUNCTURE: CPT

## 2023-12-06 PROCEDURE — 3600000002 HC SURGERY LEVEL 2 BASE: Performed by: INTERNAL MEDICINE

## 2023-12-06 PROCEDURE — 3700000000 HC ANESTHESIA ATTENDED CARE: Performed by: INTERNAL MEDICINE

## 2023-12-06 PROCEDURE — 3600000012 HC SURGERY LEVEL 2 ADDTL 15MIN: Performed by: INTERNAL MEDICINE

## 2023-12-06 PROCEDURE — 80053 COMPREHEN METABOLIC PANEL: CPT

## 2023-12-06 PROCEDURE — 2580000003 HC RX 258: Performed by: NURSE ANESTHETIST, CERTIFIED REGISTERED

## 2023-12-06 PROCEDURE — 6360000002 HC RX W HCPCS: Performed by: NURSE ANESTHETIST, CERTIFIED REGISTERED

## 2023-12-06 PROCEDURE — 85025 COMPLETE CBC W/AUTO DIFF WBC: CPT

## 2023-12-06 PROCEDURE — 7100000000 HC PACU RECOVERY - FIRST 15 MIN: Performed by: INTERNAL MEDICINE

## 2023-12-06 PROCEDURE — 2500000003 HC RX 250 WO HCPCS: Performed by: NURSE ANESTHETIST, CERTIFIED REGISTERED

## 2023-12-06 PROCEDURE — 3700000001 HC ADD 15 MINUTES (ANESTHESIA): Performed by: INTERNAL MEDICINE

## 2023-12-06 PROCEDURE — 2709999900 HC NON-CHARGEABLE SUPPLY: Performed by: INTERNAL MEDICINE

## 2023-12-06 PROCEDURE — 7100000001 HC PACU RECOVERY - ADDTL 15 MIN: Performed by: INTERNAL MEDICINE

## 2023-12-06 RX ORDER — SODIUM CHLORIDE 9 MG/ML
25 INJECTION, SOLUTION INTRAVENOUS PRN
Status: DISCONTINUED | OUTPATIENT
Start: 2023-12-06 | End: 2023-12-06 | Stop reason: HOSPADM

## 2023-12-06 RX ORDER — FENTANYL CITRATE 50 UG/ML
25 INJECTION, SOLUTION INTRAMUSCULAR; INTRAVENOUS EVERY 5 MIN PRN
Status: CANCELLED | OUTPATIENT
Start: 2023-12-06

## 2023-12-06 RX ORDER — SODIUM CHLORIDE 0.9 % (FLUSH) 0.9 %
5-40 SYRINGE (ML) INJECTION PRN
Status: DISCONTINUED | OUTPATIENT
Start: 2023-12-06 | End: 2023-12-06 | Stop reason: HOSPADM

## 2023-12-06 RX ORDER — LIDOCAINE HYDROCHLORIDE 20 MG/ML
INJECTION, SOLUTION EPIDURAL; INFILTRATION; INTRACAUDAL; PERINEURAL PRN
Status: DISCONTINUED | OUTPATIENT
Start: 2023-12-06 | End: 2023-12-06 | Stop reason: SDUPTHER

## 2023-12-06 RX ORDER — HYDRALAZINE HYDROCHLORIDE 20 MG/ML
10 INJECTION INTRAMUSCULAR; INTRAVENOUS
Status: CANCELLED | OUTPATIENT
Start: 2023-12-06

## 2023-12-06 RX ORDER — SODIUM CHLORIDE 9 MG/ML
25 INJECTION, SOLUTION INTRAVENOUS PRN
Status: CANCELLED | OUTPATIENT
Start: 2023-12-06

## 2023-12-06 RX ORDER — ROCURONIUM BROMIDE 10 MG/ML
INJECTION, SOLUTION INTRAVENOUS PRN
Status: DISCONTINUED | OUTPATIENT
Start: 2023-12-06 | End: 2023-12-06 | Stop reason: SDUPTHER

## 2023-12-06 RX ORDER — SODIUM CHLORIDE 9 MG/ML
INJECTION, SOLUTION INTRAVENOUS CONTINUOUS
Status: CANCELLED | OUTPATIENT
Start: 2023-12-06

## 2023-12-06 RX ORDER — SUCCINYLCHOLINE CHLORIDE 20 MG/ML
INJECTION INTRAMUSCULAR; INTRAVENOUS PRN
Status: DISCONTINUED | OUTPATIENT
Start: 2023-12-06 | End: 2023-12-06 | Stop reason: SDUPTHER

## 2023-12-06 RX ORDER — OXYCODONE HYDROCHLORIDE 5 MG/1
5 TABLET ORAL PRN
Status: CANCELLED | OUTPATIENT
Start: 2023-12-06 | End: 2023-12-06

## 2023-12-06 RX ORDER — ONDANSETRON 2 MG/ML
INJECTION INTRAMUSCULAR; INTRAVENOUS PRN
Status: DISCONTINUED | OUTPATIENT
Start: 2023-12-06 | End: 2023-12-06 | Stop reason: SDUPTHER

## 2023-12-06 RX ORDER — MEPERIDINE HYDROCHLORIDE 25 MG/ML
12.5 INJECTION INTRAMUSCULAR; INTRAVENOUS; SUBCUTANEOUS EVERY 5 MIN PRN
Status: CANCELLED | OUTPATIENT
Start: 2023-12-06

## 2023-12-06 RX ORDER — SODIUM CHLORIDE 0.9 % (FLUSH) 0.9 %
5-40 SYRINGE (ML) INJECTION PRN
Status: CANCELLED | OUTPATIENT
Start: 2023-12-06

## 2023-12-06 RX ORDER — OXYCODONE HYDROCHLORIDE 5 MG/1
10 TABLET ORAL PRN
Status: CANCELLED | OUTPATIENT
Start: 2023-12-06 | End: 2023-12-06

## 2023-12-06 RX ORDER — SODIUM CHLORIDE 0.9 % (FLUSH) 0.9 %
5-40 SYRINGE (ML) INJECTION EVERY 12 HOURS SCHEDULED
Status: CANCELLED | OUTPATIENT
Start: 2023-12-06

## 2023-12-06 RX ORDER — SODIUM CHLORIDE 0.9 % (FLUSH) 0.9 %
5-40 SYRINGE (ML) INJECTION EVERY 12 HOURS SCHEDULED
Status: DISCONTINUED | OUTPATIENT
Start: 2023-12-06 | End: 2023-12-06 | Stop reason: HOSPADM

## 2023-12-06 RX ORDER — DIPHENHYDRAMINE HYDROCHLORIDE 50 MG/ML
12.5 INJECTION INTRAMUSCULAR; INTRAVENOUS
Status: CANCELLED | OUTPATIENT
Start: 2023-12-06 | End: 2023-12-07

## 2023-12-06 RX ORDER — HYDROMORPHONE HYDROCHLORIDE 1 MG/ML
0.5 INJECTION, SOLUTION INTRAMUSCULAR; INTRAVENOUS; SUBCUTANEOUS EVERY 5 MIN PRN
Status: CANCELLED | OUTPATIENT
Start: 2023-12-06

## 2023-12-06 RX ORDER — PROCHLORPERAZINE EDISYLATE 5 MG/ML
5 INJECTION INTRAMUSCULAR; INTRAVENOUS
Status: CANCELLED | OUTPATIENT
Start: 2023-12-06 | End: 2023-12-07

## 2023-12-06 RX ORDER — FENTANYL CITRATE 50 UG/ML
25 INJECTION, SOLUTION INTRAMUSCULAR; INTRAVENOUS
Status: DISCONTINUED | OUTPATIENT
Start: 2023-12-06 | End: 2023-12-06 | Stop reason: HOSPADM

## 2023-12-06 RX ORDER — MIDAZOLAM HYDROCHLORIDE 1 MG/ML
INJECTION INTRAMUSCULAR; INTRAVENOUS PRN
Status: DISCONTINUED | OUTPATIENT
Start: 2023-12-06 | End: 2023-12-06 | Stop reason: SDUPTHER

## 2023-12-06 RX ORDER — DEXAMETHASONE SODIUM PHOSPHATE 4 MG/ML
4 INJECTION, SOLUTION INTRA-ARTICULAR; INTRALESIONAL; INTRAMUSCULAR; INTRAVENOUS; SOFT TISSUE
Status: CANCELLED | OUTPATIENT
Start: 2023-12-06 | End: 2023-12-07

## 2023-12-06 RX ORDER — SODIUM CHLORIDE, SODIUM LACTATE, POTASSIUM CHLORIDE, CALCIUM CHLORIDE 600; 310; 30; 20 MG/100ML; MG/100ML; MG/100ML; MG/100ML
INJECTION, SOLUTION INTRAVENOUS CONTINUOUS PRN
Status: DISCONTINUED | OUTPATIENT
Start: 2023-12-06 | End: 2023-12-06 | Stop reason: SDUPTHER

## 2023-12-06 RX ORDER — SODIUM CHLORIDE 9 MG/ML
INJECTION, SOLUTION INTRAVENOUS PRN
Status: CANCELLED | OUTPATIENT
Start: 2023-12-06

## 2023-12-06 RX ORDER — MIDAZOLAM HYDROCHLORIDE 1 MG/ML
2 INJECTION, SOLUTION INTRAMUSCULAR; INTRAVENOUS
Status: CANCELLED | OUTPATIENT
Start: 2023-12-06 | End: 2023-12-07

## 2023-12-06 RX ORDER — DEXAMETHASONE SODIUM PHOSPHATE 4 MG/ML
INJECTION, SOLUTION INTRA-ARTICULAR; INTRALESIONAL; INTRAMUSCULAR; INTRAVENOUS; SOFT TISSUE PRN
Status: DISCONTINUED | OUTPATIENT
Start: 2023-12-06 | End: 2023-12-06 | Stop reason: SDUPTHER

## 2023-12-06 RX ADMIN — SODIUM CHLORIDE, SODIUM LACTATE, POTASSIUM CHLORIDE, AND CALCIUM CHLORIDE: 600; 310; 30; 20 INJECTION, SOLUTION INTRAVENOUS at 02:48

## 2023-12-06 RX ADMIN — SUCCINYLCHOLINE CHLORIDE 180 MG: 20 INJECTION, SOLUTION INTRAMUSCULAR; INTRAVENOUS at 02:56

## 2023-12-06 RX ADMIN — DEXAMETHASONE SODIUM PHOSPHATE 4 MG: 4 INJECTION, SOLUTION INTRAMUSCULAR; INTRAVENOUS at 02:57

## 2023-12-06 RX ADMIN — PROPOFOL 50 MG: 10 INJECTION, EMULSION INTRAVENOUS at 02:59

## 2023-12-06 RX ADMIN — PROPOFOL 50 MG: 10 INJECTION, EMULSION INTRAVENOUS at 03:05

## 2023-12-06 RX ADMIN — MIDAZOLAM HYDROCHLORIDE 2 MG: 1 INJECTION, SOLUTION INTRAMUSCULAR; INTRAVENOUS at 02:51

## 2023-12-06 RX ADMIN — ROCURONIUM BROMIDE 10 MG: 10 INJECTION INTRAVENOUS at 02:56

## 2023-12-06 RX ADMIN — PROPOFOL 50 MG: 10 INJECTION, EMULSION INTRAVENOUS at 03:01

## 2023-12-06 RX ADMIN — PROPOFOL 50 MG: 10 INJECTION, EMULSION INTRAVENOUS at 03:03

## 2023-12-06 RX ADMIN — PROPOFOL 200 MG: 10 INJECTION, EMULSION INTRAVENOUS at 02:56

## 2023-12-06 RX ADMIN — LIDOCAINE HYDROCHLORIDE 100 MG: 20 INJECTION, SOLUTION EPIDURAL; INFILTRATION; INTRACAUDAL; PERINEURAL at 02:52

## 2023-12-06 RX ADMIN — ONDANSETRON HYDROCHLORIDE 4 MG: 2 INJECTION, SOLUTION INTRAMUSCULAR; INTRAVENOUS at 02:57

## 2023-12-06 ASSESSMENT — ENCOUNTER SYMPTOMS
ABDOMINAL PAIN: 0
VOMITING: 1
DIARRHEA: 0
SHORTNESS OF BREATH: 0
NAUSEA: 1

## 2023-12-06 NOTE — ANESTHESIA POSTPROCEDURE EVALUATION
Department of Anesthesiology  Postprocedure Note    Patient: Jarod Muñoz  MRN: [id number]  YOB: 1985  Date of evaluation: 12/6/2023      Procedure Summary       Date: 12/06/23 Room / Location: [facility] MAIN OR  / [facility] MAIN OR    Anesthesia Start: 0248 Anesthesia Stop: 0336    Procedure: EGD ESOPHAGOGASTRODUODENOSCOPY Diagnosis:       Food bolus obstruction of intestine ([address])      (Food bolus obstruction of intestine ([address]) [K56.699, Luzmaria Ann)    Providers:  Sangeeta Hartley MD Responsible Provider: Leatha Merino MD    Anesthesia Type: General ASA Status: 2 - Emergent            Anesthesia Type: General    Andrea Phase I: Andrea Score: 10    Andrea Phase II:        Anesthesia Post Evaluation    Patient location during evaluation: PACU  Patient participation: complete - patient participated  Level of consciousness: awake  Airway patency: patent  Nausea & Vomiting: no vomiting  Complications: no  Cardiovascular status: hemodynamically stable  Respiratory status: acceptable  Hydration status: euvolemic

## 2023-12-06 NOTE — ED PROVIDER NOTES
EMERGENCY DEPARTMENT HISTORY AND PHYSICAL EXAM     ----------------------------------------------------------------------------  Please note that this dictation was completed with [employer], the [employer] voice recognition software. Quite often unanticipated grammatical, syntax, homophones, and other interpretive errors are inadvertently transcribed by the computer software. Please disregard these errors. Please excuse any errors that have escaped final proofreading  ----------------------------------------------------------------------------      Date: 12/5/2023  Patient Name: Esther Griffin      HISTORY [facility]     Chief Complaint   Patient presents with    food stuck in throat     Patient has a history of EOE and around 2045 was eating chicken that is now stuck in his throat       History obtainted from:  Patient    Other independent source of history: Wife    HPI: Esther Griffin is a 41 y.o. male, with significant pmhx of eosinophilic esophagitis with recurrent esophageal food impactions, who presents via private vehicle  to the ED with c/o sensation of chicken stuck in his throat that he was trying to urinate p.m. Attempted to take Benadryl, drink soda very send nursing home without relief. Notes he required EGD in previous episodes. Denies chest pain, shortness of breath.       PCP: Josr Ji MD    Allergy List:   Allergies   Allergen Reactions    Codeine Hives and Other (See Comments)     bradycardia      Shellfish Allergy Nausea And Vomiting         Medications:  Current Facility-Administered Medications   Medication Dose Route Frequency Provider Last Rate Last Admin    fentaNYL (SUBLIMAZE) injection 25 mcg  25 mcg IntraVENous NOW Ky Mena MD         Current Outpatient Medications   Medication Sig Dispense Refill    pantoprazole (PROTONIX) 40 MG tablet Take 40 mg by mouth daily           PAST HISTORY       Past Medical History:  Past Medical History:   Diagnosis Date

## 2023-12-06 NOTE — ANESTHESIA PROCEDURE NOTES
Airway  Date/Time: 12/6/2023 3:08 AM  Urgency: emergent    Airway not difficult    General Information and Staff    Patient location during procedure: OR  Performed by: TESSA Franklin - CRNA  Authorized by: Kanu Curtis MD      Consent for Airway (if performed for an anesthetic, see related documentation for consents)  Consent: Written consent obtained. Risks and benefits: risks, benefits and alternatives were discussed  Consent given by: patient      Code status verified:yes  Indications and Patient Condition  Indications for airway management: anesthesia  Spontaneous ventilation: present  Preoxygenated: yes  Patient position: sniffing  Mask difficulty assessment: not attempted    Final Airway Details  Final airway type: endotracheal airway      Successful airway: ETT     Successful intubation technique: video laryngoscopy  Facilitating devices/methods: intubating stylet and cricoid pressure  Endotracheal tube insertion site: oral  Blade: Ibarra  Blade size: #4  ETT size (mm): 7.5  Cormack-Lehane Classification: grade I - full view of glottis  Placement verified by: chest auscultation, capnometry and palpation of cuff   Measured from: lips  ETT to lips (cm): 23  Number of attempts at approach: 1    Additional Comments  Induction and intubation performed with patient supine in stretcher with HOB elevated. RSI with cricoid pressure maintained until ETT placement confirmed. Lips and dentition unchanged from preop.

## 2023-12-06 NOTE — OP NOTE
Matthew French M.D.  [facility] [address] [facility], [address] [facility]  [phone]               Esophagogastroduodenoscopy (EGD) Procedure Note    NAME: Esther Griffin  :  1985  MRN:  [id number]    Indications: Food impaction    : Monica Gonzalez MD    Referring Provider:  Josr Ji MD    Staff: Circulator: Antony Peter RN  Scrub Person First: Law Lau RN    Prosthetic devices, grafts, tissues, transplant, or devices implanted: none    Medicine:  General anesthesia      Procedure Details:  After informed consent was obtained with all risks and benefits of the procedure explained and preprocedure exam completed, the patient was placed in the left lateral decubitus position. Universal protocol for patient identification was performed and documented in the nursing notes. Throughout the procedure, the patient's blood pressure was monitored at least every five minutes; pulse, and oxygen saturations were monitored continuously. All vital signs were documented in the nursing notes. The endoscope was inserted into the mouth and advanced under direct vision to second portion of the duodenum. A careful inspection was made as the gastroscope was withdrawn, including a retroflexed view of the proximal stomach; findings and interventions are described below. Findings:   Esophagus:severe trachealization and furrowing throughout the esophagus especially in the mid esophagus where the food bolus was found. Food bolus was able to be passed into the stomach with gentle passage of the scope. The scope passage lead to minor linear tear in the mid esophagus. Lower esophagus esophagitis seen. Stomach: 2 cm hiatal hernia, rest of the stomach was normal  Duodenum: normal    Interventions:    Foreign body removal    Specimens:   * No specimens in log *.  Of note-no photographs of endoscopic landmarks were able to be taken due to printer being broken

## 2023-12-06 NOTE — PROGRESS NOTES
0225- ARRIVAL INFORMATION:  Verified patient name and date of birth, scheduled procedure, and informed consent. Pt st ate chicken around 1045pm.Unable to swallow, increase secretions. Resp even and unlabored, abd soft non tender.   Will take to main OR     0247- dr. Aparicio [place] in and reviewed procedure with pt

## 2023-12-06 NOTE — ED TRIAGE NOTES
Patient ambulatory to triage from waiting room with complaint of chicken stuck in his throat. Patient has a history of EOE. Reports eating chicken around 2045 that got stuck. Patient unable to swallow secretions at this time. Patient reporting some difficulty breathing, oxygen saturation remains above 95% in triage.

## 2023-12-06 NOTE — ED NOTES
Patient discharged from the ED by Dr Angela Ortez in ED and Dr Tonny French with GI. Diagnosis, medications, precautions and follow-ups were reviewed with the patient/family. Questions were asked and answered prior to departure. Patient departed the ED via ambulatory and was accompanied by wife.        Carol Soto  12/06/23 [id number]

## 2023-12-06 NOTE — PROGRESS NOTES
Endoscope was pre-cleaned at bedside immediately following procedure by Skip Dewitt.     0326- pt taken to PACU by Gabby Sibley

## 2023-12-06 NOTE — DISCHARGE INSTRUCTIONS
[facility] Tom Hernandez M.D.  [place]  [phone]         EGD DISCHARGE INSTRUCTIONS      Sneha García  [id number]  1985    DISCOMFORT:  Sore throat- throat lozenges or warm salt water gargle  Redness at IV site- apply warm compress to area; if redness or soreness persist- contact your physician  Gaseous discomfort- walking, belching will help relieve any discomfort  You may not operate a vehicle for 12 hours  You may not engage in an occupation involving machinery or appliances for the  rest of today  You may not drink alcoholic beverages for at least 12 hours  Avoid making any critical decisions for at least 24 hours    DIET:   You may resume your normal diet, but some patients find that heavy or large  meals may lead to indigestion or vomiting. I suggest a light meal as first food  Intake. I recommend a whole food, plant-based diet for your overall health. ACTIVITY:  You may resume your normal daily activities. It is recommended that you spend the remainder of the day resting - avoid any strenuous activity. CALL RAYNA Moore ANY SIGN OF:   Increasing pain, nausea, vomiting  Abdominal distension (swelling)  Significant bleeding (oral or rectal)  Fever   Pain in chest area  Shortness of breath    Additional Instructions:   Call Dr. Ivone Hernandez if any questions or problems at [phone]   EGD showed food impaction in the mid esophagus with passage into your stomach and dilation. Signs of severe eosinophilic esophagitis and small hiatal hernia. Full liquid diet today. Advance to soft mechanical diet tomorrow. Take your pantoprazole 40 mg twice a day. Setup follow-up office visit with RGFRANNY-Dr. Nisa Begum.

## (undated) DEVICE — Device: Brand: ENDO SMARTCAP

## (undated) DEVICE — SOLIDIFIER MEDC 1200ML -- CONVERT TO 356117

## (undated) DEVICE — YANKAUER,TAPERED BULBOUS TIP,W/O VENT: Brand: MEDLINE

## (undated) DEVICE — SET ADMIN 16ML TBNG L100IN 2 Y INJ SITE IV PIGGY BK DISP

## (undated) DEVICE — Z DISCONTINUED PER MEDLINE LINE GAS SAMPLING O2/CO2 LNG AD 13 FT NSL W/ TBNG FILTERLINE

## (undated) DEVICE — 1200 GUARD II KIT W/5MM TUBE W/O VAC TUBE: Brand: GUARDIAN

## (undated) DEVICE — Device

## (undated) DEVICE — FORCEPS BX L240CM JAW DIA2.8MM L CAP W/ NDL MIC MESH TOOTH

## (undated) DEVICE — FORCEPS BX L160CM DIA8MM GRSP DISECT CUP TIP NONLOCKING ROT

## (undated) DEVICE — SYR 10ML LUER LOK 1/5ML GRAD --

## (undated) DEVICE — BITE BLK ENDOSCP AD 54FR GRN POLYETH ENDOSCP W STRP SLD

## (undated) DEVICE — NEEDLE HYPO 18GA L1.5IN PNK S STL HUB POLYPR SHLD REG BVL

## (undated) DEVICE — CATH IV AUTOGRD BC PNK 20GA 25 -- INSYTE

## (undated) DEVICE — ELECTRODE,RADIOTRANSLUCENT,FOAM,5PK: Brand: MEDLINE

## (undated) DEVICE — BITE BLK ENDO PLAS AD DISP

## (undated) DEVICE — KIT,1200CC CANISTER,3/16"X6' TUBING: Brand: MEDLINE INDUSTRIES, INC.

## (undated) DEVICE — NEONATAL-ADULT SPO2 SENSOR: Brand: NELLCOR

## (undated) DEVICE — TUBING HYDR IRR --

## (undated) DEVICE — BLOCK BITE ENDOSCP AD 21 MM W/ DIL BLU LF DISP

## (undated) DEVICE — BASIN EMSIS 16OZ GRAPHITE PLAS KID SHP MOLD GRAD FOR ORAL

## (undated) DEVICE — SYR 3ML LL TIP 1/10ML GRAD --

## (undated) DEVICE — TOWEL 4 PLY TISS 19X30 SUE WHT